# Patient Record
Sex: MALE | Race: BLACK OR AFRICAN AMERICAN | Employment: OTHER | URBAN - METROPOLITAN AREA
[De-identification: names, ages, dates, MRNs, and addresses within clinical notes are randomized per-mention and may not be internally consistent; named-entity substitution may affect disease eponyms.]

---

## 2021-08-04 ENCOUNTER — APPOINTMENT (OUTPATIENT)
Dept: GENERAL RADIOLOGY | Age: 71
DRG: 177 | End: 2021-08-04
Payer: MEDICARE

## 2021-08-04 ENCOUNTER — APPOINTMENT (OUTPATIENT)
Dept: CT IMAGING | Age: 71
DRG: 177 | End: 2021-08-04
Payer: MEDICARE

## 2021-08-04 ENCOUNTER — HOSPITAL ENCOUNTER (INPATIENT)
Age: 71
LOS: 5 days | Discharge: HOME | DRG: 177 | End: 2021-08-09
Attending: EMERGENCY MEDICINE | Admitting: INTERNAL MEDICINE
Payer: MEDICARE

## 2021-08-04 DIAGNOSIS — J69.0 ASPIRATION PNEUMONIA OF RIGHT LOWER LOBE, UNSPECIFIED ASPIRATION PNEUMONIA TYPE (HCC): Primary | ICD-10-CM

## 2021-08-04 DIAGNOSIS — E87.20 LACTIC ACIDOSIS: ICD-10-CM

## 2021-08-04 DIAGNOSIS — N17.9 AKI (ACUTE KIDNEY INJURY) (HCC): ICD-10-CM

## 2021-08-04 DIAGNOSIS — D72.829 LEUKOCYTOSIS, UNSPECIFIED TYPE: ICD-10-CM

## 2021-08-04 PROBLEM — K92.0 HEMATEMESIS: Status: ACTIVE | Noted: 2021-08-04

## 2021-08-04 PROBLEM — F10.10 ETOH ABUSE: Status: ACTIVE | Noted: 2021-08-04

## 2021-08-04 PROBLEM — J18.9 PNEUMONIA: Status: ACTIVE | Noted: 2021-08-04

## 2021-08-04 PROBLEM — J44.9 COPD (CHRONIC OBSTRUCTIVE PULMONARY DISEASE) (HCC): Status: ACTIVE | Noted: 2021-08-04

## 2021-08-04 LAB
ACANTHOCYTES: ABNORMAL
ACETAMINOPHEN LEVEL: <5 MCG/ML (ref 10–30)
ALBUMIN SERPL-MCNC: 4.6 G/DL (ref 3.5–5.2)
ALP BLD-CCNC: 100 U/L (ref 40–129)
ALT SERPL-CCNC: 18 U/L (ref 0–40)
AMPHETAMINE SCREEN, URINE: NOT DETECTED
ANION GAP SERPL CALCULATED.3IONS-SCNC: 26 MMOL/L (ref 7–16)
ANISOCYTOSIS: ABNORMAL
AST SERPL-CCNC: 28 U/L (ref 0–39)
B.E.: -4.9 MMOL/L (ref -3–3)
BACTERIA: ABNORMAL /HPF
BARBITURATE SCREEN URINE: NOT DETECTED
BASOPHILS ABSOLUTE: 0 E9/L (ref 0–0.2)
BASOPHILS RELATIVE PERCENT: 0.2 % (ref 0–2)
BENZODIAZEPINE SCREEN, URINE: NOT DETECTED
BILIRUB SERPL-MCNC: 0.5 MG/DL (ref 0–1.2)
BILIRUBIN URINE: NEGATIVE
BLOOD, URINE: ABNORMAL
BUN BLDV-MCNC: 26 MG/DL (ref 6–23)
BURR CELLS: ABNORMAL
CALCIUM SERPL-MCNC: 9.6 MG/DL (ref 8.6–10.2)
CANNABINOID SCREEN URINE: NOT DETECTED
CHLORIDE BLD-SCNC: 94 MMOL/L (ref 98–107)
CHP ED QC CHECK: NORMAL
CLARITY: CLEAR
CO2: 21 MMOL/L (ref 22–29)
COCAINE METABOLITE SCREEN URINE: NOT DETECTED
COHB: 1 % (ref 0–1.5)
COLOR: YELLOW
CREAT SERPL-MCNC: 1.4 MG/DL (ref 0.7–1.2)
CRITICAL: ABNORMAL
DATE ANALYZED: ABNORMAL
DATE OF COLLECTION: ABNORMAL
EKG ATRIAL RATE: 102 BPM
EKG P AXIS: 81 DEGREES
EKG P-R INTERVAL: 166 MS
EKG Q-T INTERVAL: 376 MS
EKG QRS DURATION: 84 MS
EKG QTC CALCULATION (BAZETT): 490 MS
EKG R AXIS: 73 DEGREES
EKG T AXIS: 49 DEGREES
EKG VENTRICULAR RATE: 102 BPM
EOSINOPHILS ABSOLUTE: 0 E9/L (ref 0.05–0.5)
EOSINOPHILS RELATIVE PERCENT: 31.8 % (ref 0–6)
ETHANOL: 29 MG/DL (ref 0–0.08)
FENTANYL SCREEN, URINE: NOT DETECTED
FERRITIN: 157 NG/ML
GFR AFRICAN AMERICAN: >60
GFR NON-AFRICAN AMERICAN: >60 ML/MIN/1.73
GLUCOSE BLD-MCNC: 112 MG/DL
GLUCOSE BLD-MCNC: 89 MG/DL (ref 74–99)
GLUCOSE URINE: NEGATIVE MG/DL
HCO3: 21.3 MMOL/L (ref 22–26)
HCT VFR BLD CALC: 38.8 % (ref 37–54)
HCT VFR BLD CALC: 43.4 % (ref 37–54)
HCT VFR BLD CALC: 49.3 % (ref 37–54)
HEMOGLOBIN: 11.3 G/DL (ref 12.5–16.5)
HEMOGLOBIN: 12.9 G/DL (ref 12.5–16.5)
HEMOGLOBIN: 14.4 G/DL (ref 12.5–16.5)
HHB: 8 % (ref 0–5)
HYALINE CASTS: ABNORMAL /LPF (ref 0–2)
KETONES, URINE: 15 MG/DL
LAB: ABNORMAL
LACTIC ACID, SEPSIS: 4.4 MMOL/L (ref 0.5–1.9)
LACTIC ACID, SEPSIS: 4.7 MMOL/L (ref 0.5–1.9)
LACTIC ACID: 10.3 MMOL/L (ref 0.5–2.2)
LACTIC ACID: 4.4 MMOL/L (ref 0.5–2.2)
LEUKOCYTE ESTERASE, URINE: NEGATIVE
LIPASE: 7 U/L (ref 13–60)
LYMPHOCYTES ABSOLUTE: 1 E9/L (ref 1.5–4)
LYMPHOCYTES RELATIVE PERCENT: 5.2 % (ref 20–42)
Lab: ABNORMAL
Lab: NORMAL
MAGNESIUM: 2.5 MG/DL (ref 1.6–2.6)
MCH RBC QN AUTO: 23.1 PG (ref 26–35)
MCHC RBC AUTO-ENTMCNC: 29.2 % (ref 32–34.5)
MCV RBC AUTO: 79.1 FL (ref 80–99.9)
METER GLUCOSE: 112 MG/DL (ref 74–99)
METHADONE SCREEN, URINE: NOT DETECTED
METHB: 0.4 % (ref 0–1.5)
MODE: ABNORMAL
MONOCYTES ABSOLUTE: 0.4 E9/L (ref 0.1–0.95)
MONOCYTES RELATIVE PERCENT: 1.7 % (ref 2–12)
NEUTROPHILS ABSOLUTE: 18.51 E9/L (ref 1.8–7.3)
NEUTROPHILS RELATIVE PERCENT: 93 % (ref 43–80)
NITRITE, URINE: NEGATIVE
O2 CONTENT: 17.7 ML/DL
O2 SATURATION: 91.9 % (ref 92–98.5)
O2HB: 90.6 % (ref 94–97)
OPERATOR ID: 925
OPIATE SCREEN URINE: NOT DETECTED
OVALOCYTES: ABNORMAL
OXYCODONE URINE: NOT DETECTED
PATIENT TEMP: 37 C
PCO2: 43.6 MMHG (ref 35–45)
PDW BLD-RTO: 14.4 FL (ref 11.5–15)
PH BLOOD GAS: 7.31 (ref 7.35–7.45)
PH UA: 5 (ref 5–9)
PHENCYCLIDINE SCREEN URINE: NOT DETECTED
PHOSPHORUS: 6.8 MG/DL (ref 2.5–4.5)
PLATELET # BLD: 263 E9/L (ref 130–450)
PMV BLD AUTO: 10.9 FL (ref 7–12)
PO2: 67.9 MMHG (ref 75–100)
POIKILOCYTES: ABNORMAL
POLYCHROMASIA: ABNORMAL
POTASSIUM SERPL-SCNC: 3.9 MMOL/L (ref 3.5–5)
PROCALCITONIN: 2.19 NG/ML (ref 0–0.08)
PROTEIN UA: NEGATIVE MG/DL
RBC # BLD: 6.23 E12/L (ref 3.8–5.8)
RBC UA: ABNORMAL /HPF (ref 0–2)
ROULEAUX: ABNORMAL
SALICYLATE, SERUM: <0.3 MG/DL (ref 0–30)
SCHISTOCYTES: ABNORMAL
SODIUM BLD-SCNC: 141 MMOL/L (ref 132–146)
SOURCE, BLOOD GAS: ABNORMAL
SPECIFIC GRAVITY UA: >=1.03 (ref 1–1.03)
THB: 13.9 G/DL (ref 11.5–16.5)
TIME ANALYZED: 845
TOTAL PROTEIN: 8.2 G/DL (ref 6.4–8.3)
TRICYCLIC ANTIDEPRESSANTS SCREEN SERUM: NEGATIVE NG/ML
TROPONIN, HIGH SENSITIVITY: 39 NG/L (ref 0–11)
TROPONIN, HIGH SENSITIVITY: 45 NG/L (ref 0–11)
TROPONIN, HIGH SENSITIVITY: 50 NG/L (ref 0–11)
UROBILINOGEN, URINE: 0.2 E.U./DL
WBC # BLD: 19.9 E9/L (ref 4.5–11.5)
WBC UA: ABNORMAL /HPF (ref 0–5)

## 2021-08-04 PROCEDURE — 84100 ASSAY OF PHOSPHORUS: CPT

## 2021-08-04 PROCEDURE — 94640 AIRWAY INHALATION TREATMENT: CPT

## 2021-08-04 PROCEDURE — 6360000002 HC RX W HCPCS: Performed by: INTERNAL MEDICINE

## 2021-08-04 PROCEDURE — 80143 DRUG ASSAY ACETAMINOPHEN: CPT

## 2021-08-04 PROCEDURE — 80053 COMPREHEN METABOLIC PANEL: CPT

## 2021-08-04 PROCEDURE — 94660 CPAP INITIATION&MGMT: CPT

## 2021-08-04 PROCEDURE — 6370000000 HC RX 637 (ALT 250 FOR IP): Performed by: EMERGENCY MEDICINE

## 2021-08-04 PROCEDURE — 80179 DRUG ASSAY SALICYLATE: CPT

## 2021-08-04 PROCEDURE — 6360000002 HC RX W HCPCS

## 2021-08-04 PROCEDURE — 93005 ELECTROCARDIOGRAM TRACING: CPT | Performed by: EMERGENCY MEDICINE

## 2021-08-04 PROCEDURE — 87040 BLOOD CULTURE FOR BACTERIA: CPT

## 2021-08-04 PROCEDURE — 36415 COLL VENOUS BLD VENIPUNCTURE: CPT

## 2021-08-04 PROCEDURE — 6370000000 HC RX 637 (ALT 250 FOR IP): Performed by: INTERNAL MEDICINE

## 2021-08-04 PROCEDURE — C9113 INJ PANTOPRAZOLE SODIUM, VIA: HCPCS | Performed by: STUDENT IN AN ORGANIZED HEALTH CARE EDUCATION/TRAINING PROGRAM

## 2021-08-04 PROCEDURE — 96367 TX/PROPH/DG ADDL SEQ IV INF: CPT

## 2021-08-04 PROCEDURE — 71045 X-RAY EXAM CHEST 1 VIEW: CPT

## 2021-08-04 PROCEDURE — 83690 ASSAY OF LIPASE: CPT

## 2021-08-04 PROCEDURE — 83605 ASSAY OF LACTIC ACID: CPT

## 2021-08-04 PROCEDURE — 96365 THER/PROPH/DIAG IV INF INIT: CPT

## 2021-08-04 PROCEDURE — 84145 PROCALCITONIN (PCT): CPT

## 2021-08-04 PROCEDURE — 74177 CT ABD & PELVIS W/CONTRAST: CPT

## 2021-08-04 PROCEDURE — 93010 ELECTROCARDIOGRAM REPORT: CPT | Performed by: INTERNAL MEDICINE

## 2021-08-04 PROCEDURE — 82728 ASSAY OF FERRITIN: CPT

## 2021-08-04 PROCEDURE — 6360000004 HC RX CONTRAST MEDICATION: Performed by: RADIOLOGY

## 2021-08-04 PROCEDURE — 2580000003 HC RX 258: Performed by: INTERNAL MEDICINE

## 2021-08-04 PROCEDURE — 2580000003 HC RX 258: Performed by: STUDENT IN AN ORGANIZED HEALTH CARE EDUCATION/TRAINING PROGRAM

## 2021-08-04 PROCEDURE — 81001 URINALYSIS AUTO W/SCOPE: CPT

## 2021-08-04 PROCEDURE — 99285 EMERGENCY DEPT VISIT HI MDM: CPT

## 2021-08-04 PROCEDURE — 85014 HEMATOCRIT: CPT

## 2021-08-04 PROCEDURE — 83735 ASSAY OF MAGNESIUM: CPT

## 2021-08-04 PROCEDURE — 94664 DEMO&/EVAL PT USE INHALER: CPT

## 2021-08-04 PROCEDURE — 2700000000 HC OXYGEN THERAPY PER DAY

## 2021-08-04 PROCEDURE — 2060000000 HC ICU INTERMEDIATE R&B

## 2021-08-04 PROCEDURE — 2580000003 HC RX 258: Performed by: EMERGENCY MEDICINE

## 2021-08-04 PROCEDURE — C9113 INJ PANTOPRAZOLE SODIUM, VIA: HCPCS | Performed by: INTERNAL MEDICINE

## 2021-08-04 PROCEDURE — 82962 GLUCOSE BLOOD TEST: CPT

## 2021-08-04 PROCEDURE — 6360000002 HC RX W HCPCS: Performed by: STUDENT IN AN ORGANIZED HEALTH CARE EDUCATION/TRAINING PROGRAM

## 2021-08-04 PROCEDURE — 82077 ASSAY SPEC XCP UR&BREATH IA: CPT

## 2021-08-04 PROCEDURE — 96361 HYDRATE IV INFUSION ADD-ON: CPT

## 2021-08-04 PROCEDURE — 2500000003 HC RX 250 WO HCPCS: Performed by: STUDENT IN AN ORGANIZED HEALTH CARE EDUCATION/TRAINING PROGRAM

## 2021-08-04 PROCEDURE — 80307 DRUG TEST PRSMV CHEM ANLYZR: CPT

## 2021-08-04 PROCEDURE — 82805 BLOOD GASES W/O2 SATURATION: CPT

## 2021-08-04 PROCEDURE — 85018 HEMOGLOBIN: CPT

## 2021-08-04 PROCEDURE — 84484 ASSAY OF TROPONIN QUANT: CPT

## 2021-08-04 PROCEDURE — 85025 COMPLETE CBC W/AUTO DIFF WBC: CPT

## 2021-08-04 RX ORDER — ACETAMINOPHEN 325 MG/1
650 TABLET ORAL EVERY 6 HOURS PRN
Status: DISCONTINUED | OUTPATIENT
Start: 2021-08-04 | End: 2021-08-09 | Stop reason: HOSPADM

## 2021-08-04 RX ORDER — PANTOPRAZOLE SODIUM 40 MG/10ML
40 INJECTION, POWDER, LYOPHILIZED, FOR SOLUTION INTRAVENOUS EVERY 12 HOURS
Status: DISCONTINUED | OUTPATIENT
Start: 2021-08-04 | End: 2021-08-09 | Stop reason: HOSPADM

## 2021-08-04 RX ORDER — FENTANYL CITRATE 50 UG/ML
50 INJECTION, SOLUTION INTRAMUSCULAR; INTRAVENOUS ONCE
Status: COMPLETED | OUTPATIENT
Start: 2021-08-04 | End: 2021-08-04

## 2021-08-04 RX ORDER — LORAZEPAM 2 MG/ML
4 INJECTION INTRAMUSCULAR
Status: DISCONTINUED | OUTPATIENT
Start: 2021-08-04 | End: 2021-08-09 | Stop reason: HOSPADM

## 2021-08-04 RX ORDER — SODIUM CHLORIDE 9 MG/ML
INJECTION, SOLUTION INTRAVENOUS CONTINUOUS
Status: DISCONTINUED | OUTPATIENT
Start: 2021-08-04 | End: 2021-08-04

## 2021-08-04 RX ORDER — LORAZEPAM 1 MG/1
2 TABLET ORAL
Status: DISCONTINUED | OUTPATIENT
Start: 2021-08-04 | End: 2021-08-09 | Stop reason: HOSPADM

## 2021-08-04 RX ORDER — SODIUM CHLORIDE 9 MG/ML
25 INJECTION, SOLUTION INTRAVENOUS PRN
Status: DISCONTINUED | OUTPATIENT
Start: 2021-08-04 | End: 2021-08-09 | Stop reason: HOSPADM

## 2021-08-04 RX ORDER — THIAMINE HYDROCHLORIDE 100 MG/ML
100 INJECTION, SOLUTION INTRAMUSCULAR; INTRAVENOUS DAILY
Status: DISCONTINUED | OUTPATIENT
Start: 2021-08-04 | End: 2021-08-04 | Stop reason: SDUPTHER

## 2021-08-04 RX ORDER — BUDESONIDE AND FORMOTEROL FUMARATE DIHYDRATE 160; 4.5 UG/1; UG/1
2 AEROSOL RESPIRATORY (INHALATION) 2 TIMES DAILY
Status: DISCONTINUED | OUTPATIENT
Start: 2021-08-04 | End: 2021-08-05 | Stop reason: CLARIF

## 2021-08-04 RX ORDER — ATORVASTATIN CALCIUM 20 MG/1
20 TABLET, FILM COATED ORAL DAILY
Status: DISCONTINUED | OUTPATIENT
Start: 2021-08-04 | End: 2021-08-09 | Stop reason: HOSPADM

## 2021-08-04 RX ORDER — FENTANYL CITRATE 50 UG/ML
INJECTION, SOLUTION INTRAMUSCULAR; INTRAVENOUS
Status: COMPLETED
Start: 2021-08-04 | End: 2021-08-04

## 2021-08-04 RX ORDER — ONDANSETRON 2 MG/ML
4 INJECTION INTRAMUSCULAR; INTRAVENOUS EVERY 6 HOURS PRN
Status: DISCONTINUED | OUTPATIENT
Start: 2021-08-04 | End: 2021-08-09 | Stop reason: HOSPADM

## 2021-08-04 RX ORDER — SODIUM CHLORIDE 9 MG/ML
INJECTION, SOLUTION INTRAVENOUS CONTINUOUS
Status: DISCONTINUED | OUTPATIENT
Start: 2021-08-04 | End: 2021-08-05

## 2021-08-04 RX ORDER — LORAZEPAM 1 MG/1
1 TABLET ORAL
Status: DISCONTINUED | OUTPATIENT
Start: 2021-08-04 | End: 2021-08-09 | Stop reason: HOSPADM

## 2021-08-04 RX ORDER — IPRATROPIUM BROMIDE AND ALBUTEROL SULFATE 2.5; .5 MG/3ML; MG/3ML
1 SOLUTION RESPIRATORY (INHALATION)
Status: COMPLETED | OUTPATIENT
Start: 2021-08-04 | End: 2021-08-04

## 2021-08-04 RX ORDER — LORAZEPAM 2 MG/ML
INJECTION INTRAMUSCULAR
Status: COMPLETED
Start: 2021-08-04 | End: 2021-08-04

## 2021-08-04 RX ORDER — LORAZEPAM 2 MG/ML
3 INJECTION INTRAMUSCULAR
Status: DISCONTINUED | OUTPATIENT
Start: 2021-08-04 | End: 2021-08-09 | Stop reason: HOSPADM

## 2021-08-04 RX ORDER — 0.9 % SODIUM CHLORIDE 0.9 %
1000 INTRAVENOUS SOLUTION INTRAVENOUS ONCE
Status: COMPLETED | OUTPATIENT
Start: 2021-08-04 | End: 2021-08-04

## 2021-08-04 RX ORDER — THIAMINE HYDROCHLORIDE 100 MG/ML
100 INJECTION, SOLUTION INTRAMUSCULAR; INTRAVENOUS DAILY
Status: DISCONTINUED | OUTPATIENT
Start: 2021-08-04 | End: 2021-08-09 | Stop reason: HOSPADM

## 2021-08-04 RX ORDER — LORAZEPAM 2 MG/ML
2 INJECTION INTRAMUSCULAR
Status: DISCONTINUED | OUTPATIENT
Start: 2021-08-04 | End: 2021-08-09 | Stop reason: HOSPADM

## 2021-08-04 RX ORDER — ACETAMINOPHEN 650 MG/1
650 SUPPOSITORY RECTAL EVERY 6 HOURS PRN
Status: DISCONTINUED | OUTPATIENT
Start: 2021-08-04 | End: 2021-08-09 | Stop reason: HOSPADM

## 2021-08-04 RX ORDER — SODIUM CHLORIDE 9 MG/ML
10 INJECTION INTRAVENOUS EVERY 12 HOURS
Status: DISCONTINUED | OUTPATIENT
Start: 2021-08-04 | End: 2021-08-09 | Stop reason: HOSPADM

## 2021-08-04 RX ORDER — FOLIC ACID 5 MG/ML
1 INJECTION, SOLUTION INTRAMUSCULAR; INTRAVENOUS; SUBCUTANEOUS DAILY
Status: DISCONTINUED | OUTPATIENT
Start: 2021-08-04 | End: 2021-08-09 | Stop reason: HOSPADM

## 2021-08-04 RX ORDER — LORAZEPAM 2 MG/ML
1 INJECTION INTRAMUSCULAR
Status: DISCONTINUED | OUTPATIENT
Start: 2021-08-04 | End: 2021-08-09 | Stop reason: HOSPADM

## 2021-08-04 RX ORDER — LORAZEPAM 1 MG/1
4 TABLET ORAL
Status: DISCONTINUED | OUTPATIENT
Start: 2021-08-04 | End: 2021-08-09 | Stop reason: HOSPADM

## 2021-08-04 RX ORDER — SODIUM CHLORIDE 0.9 % (FLUSH) 0.9 %
5-40 SYRINGE (ML) INJECTION PRN
Status: DISCONTINUED | OUTPATIENT
Start: 2021-08-04 | End: 2021-08-09 | Stop reason: HOSPADM

## 2021-08-04 RX ORDER — ONDANSETRON 4 MG/1
4 TABLET, ORALLY DISINTEGRATING ORAL EVERY 8 HOURS PRN
Status: DISCONTINUED | OUTPATIENT
Start: 2021-08-04 | End: 2021-08-09 | Stop reason: HOSPADM

## 2021-08-04 RX ORDER — MULTIVITAMIN WITH IRON
1 TABLET ORAL DAILY
Status: DISCONTINUED | OUTPATIENT
Start: 2021-08-04 | End: 2021-08-09 | Stop reason: HOSPADM

## 2021-08-04 RX ORDER — SODIUM CHLORIDE 0.9 % (FLUSH) 0.9 %
5-40 SYRINGE (ML) INJECTION EVERY 12 HOURS SCHEDULED
Status: DISCONTINUED | OUTPATIENT
Start: 2021-08-04 | End: 2021-08-09 | Stop reason: HOSPADM

## 2021-08-04 RX ORDER — LORAZEPAM 1 MG/1
3 TABLET ORAL
Status: DISCONTINUED | OUTPATIENT
Start: 2021-08-04 | End: 2021-08-09 | Stop reason: HOSPADM

## 2021-08-04 RX ORDER — AMLODIPINE BESYLATE 5 MG/1
5 TABLET ORAL DAILY
Status: DISCONTINUED | OUTPATIENT
Start: 2021-08-04 | End: 2021-08-08

## 2021-08-04 RX ADMIN — NITROGLYCERIN 0.5 INCH: 20 OINTMENT TOPICAL at 09:02

## 2021-08-04 RX ADMIN — ACETAMINOPHEN 650 MG: 650 SUPPOSITORY RECTAL at 09:46

## 2021-08-04 RX ADMIN — PANTOPRAZOLE SODIUM 40 MG: 40 INJECTION, POWDER, FOR SOLUTION INTRAVENOUS at 07:46

## 2021-08-04 RX ADMIN — FENTANYL CITRATE 50 MCG: 50 INJECTION, SOLUTION INTRAMUSCULAR; INTRAVENOUS at 09:27

## 2021-08-04 RX ADMIN — FOLIC ACID 1 MG: 5 INJECTION, SOLUTION INTRAMUSCULAR; INTRAVENOUS; SUBCUTANEOUS at 09:50

## 2021-08-04 RX ADMIN — SODIUM CHLORIDE 1000 ML: 9 INJECTION, SOLUTION INTRAVENOUS at 04:40

## 2021-08-04 RX ADMIN — IPRATROPIUM BROMIDE AND ALBUTEROL SULFATE 1 AMPULE: .5; 3 SOLUTION RESPIRATORY (INHALATION) at 06:14

## 2021-08-04 RX ADMIN — SODIUM CHLORIDE: 9 INJECTION, SOLUTION INTRAVENOUS at 07:37

## 2021-08-04 RX ADMIN — SODIUM CHLORIDE: 9 INJECTION, SOLUTION INTRAVENOUS at 22:26

## 2021-08-04 RX ADMIN — IPRATROPIUM BROMIDE AND ALBUTEROL SULFATE 1 AMPULE: .5; 3 SOLUTION RESPIRATORY (INHALATION) at 06:21

## 2021-08-04 RX ADMIN — AMPICILLIN SODIUM AND SULBACTAM SODIUM 3000 MG: 2; 1 INJECTION, POWDER, FOR SOLUTION INTRAMUSCULAR; INTRAVENOUS at 06:09

## 2021-08-04 RX ADMIN — THIAMINE HYDROCHLORIDE 100 MG: 100 INJECTION, SOLUTION INTRAMUSCULAR; INTRAVENOUS at 09:53

## 2021-08-04 RX ADMIN — Medication 10 ML: at 09:28

## 2021-08-04 RX ADMIN — LORAZEPAM 2 MG: 2 INJECTION INTRAMUSCULAR; INTRAVENOUS at 07:40

## 2021-08-04 RX ADMIN — AMPICILLIN SODIUM AND SULBACTAM SODIUM 3000 MG: 2; 1 INJECTION, POWDER, FOR SOLUTION INTRAMUSCULAR; INTRAVENOUS at 09:00

## 2021-08-04 RX ADMIN — IOPAMIDOL 90 ML: 755 INJECTION, SOLUTION INTRAVENOUS at 05:52

## 2021-08-04 RX ADMIN — Medication 10 ML: at 22:31

## 2021-08-04 RX ADMIN — PANTOPRAZOLE SODIUM 40 MG: 40 INJECTION, POWDER, FOR SOLUTION INTRAVENOUS at 22:30

## 2021-08-04 RX ADMIN — AMPICILLIN SODIUM AND SULBACTAM SODIUM 3000 MG: 2; 1 INJECTION, POWDER, FOR SOLUTION INTRAMUSCULAR; INTRAVENOUS at 13:11

## 2021-08-04 RX ADMIN — ONDANSETRON 4 MG: 2 INJECTION INTRAMUSCULAR; INTRAVENOUS at 07:45

## 2021-08-04 RX ADMIN — ACETAMINOPHEN 650 MG: 325 TABLET ORAL at 23:55

## 2021-08-04 RX ADMIN — AMPICILLIN SODIUM AND SULBACTAM SODIUM 3000 MG: 2; 1 INJECTION, POWDER, FOR SOLUTION INTRAMUSCULAR; INTRAVENOUS at 23:55

## 2021-08-04 RX ADMIN — SODIUM CHLORIDE, PRESERVATIVE FREE 10 ML: 5 INJECTION INTRAVENOUS at 07:42

## 2021-08-04 RX ADMIN — IPRATROPIUM BROMIDE AND ALBUTEROL SULFATE 1 AMPULE: .5; 3 SOLUTION RESPIRATORY (INHALATION) at 06:15

## 2021-08-04 RX ADMIN — SODIUM CHLORIDE 160 MG/HR: 9 INJECTION, SOLUTION INTRAVENOUS at 04:42

## 2021-08-04 ASSESSMENT — ENCOUNTER SYMPTOMS
VOMITING: 1
ABDOMINAL PAIN: 1
ABDOMINAL DISTENTION: 1

## 2021-08-04 ASSESSMENT — PAIN SCALES - GENERAL
PAINLEVEL_OUTOF10: 0
PAINLEVEL_OUTOF10: 7
PAINLEVEL_OUTOF10: 8
PAINLEVEL_OUTOF10: 5
PAINLEVEL_OUTOF10: 5

## 2021-08-04 ASSESSMENT — PAIN DESCRIPTION - LOCATION
LOCATION: ABDOMEN
LOCATION: THROAT

## 2021-08-04 ASSESSMENT — PAIN DESCRIPTION - DESCRIPTORS
DESCRIPTORS: BURNING
DESCRIPTORS: BURNING

## 2021-08-04 ASSESSMENT — PAIN DESCRIPTION - FREQUENCY: FREQUENCY: CONTINUOUS

## 2021-08-04 ASSESSMENT — PAIN DESCRIPTION - ORIENTATION: ORIENTATION: LOWER

## 2021-08-04 ASSESSMENT — PAIN DESCRIPTION - PAIN TYPE: TYPE: ACUTE PAIN

## 2021-08-04 NOTE — CONSULTS
GENERAL SURGERY  CONSULT NOTE  8/4/2021    Physician Consulted: Dr. Chepe Abernathy  Reason for Consult: Hematemesis  Referring Physician: Dr. Sterling Olivera    HPI  Jennifer Todd is a 65 y.o. male with no significant PMH who presented to the ED after an episode of hematemesis. Patient was drinking last night and had an episode of forceful vomiting, where he noticed thick, bright red blood. He hasn't had any further episodes and denies nausea. He denies any bloody bowel movements or BRBPR. He is also complaining of some abdominal pain, non-specific. Currently on PAP, apparently for some desaturations while patient was lying on his back. CXR showing bibasilar atelectasis. He has never had an EGD or colonoscopy before. CT A/P showing wall thickening of esophagus. Past Medical History:   Diagnosis Date    Arthritis     Hypertension     Tobacco abuse        History reviewed. No pertinent surgical history. Medications Prior to Admission:    Prior to Admission medications    Not on File       No Known Allergies    History reviewed. No pertinent family history. Social History     Tobacco Use    Smoking status: Current Every Day Smoker     Packs/day: 0.25     Years: 50.00     Pack years: 12.50     Types: Cigarettes    Smokeless tobacco: Never Used   Vaping Use    Vaping Use: Never used   Substance Use Topics    Alcohol use: Yes     Comment: social    Drug use: No         Review of Systems   General ROS: negative  Hematological and Lymphatic ROS: negative  Respiratory ROS: negative  Cardiovascular ROS: negative  Gastrointestinal ROS: positive for - abdominal pain and hematemesis  Genito-Urinary ROS: negative  Musculoskeletal ROS: negative      PHYSICAL EXAM:    Vitals:    08/04/21 1116   BP: (!) 135/92   Pulse: 105   Resp: 13   Temp:    SpO2: 97%       General Appearance:  awake, alert, oriented, in no acute distress  Skin:  Skin color, texture, turgor normal. No rashes or lesions.   Head/face:  NCAT  Eyes:  No gross abnormalities. Lungs: normal respiratory effort on BiPAP  Heart:  Heart regular rate and rhythm   Abdomen:  Soft, moderately distended, minimally tender to palpation suprapubic, no rebound or guarding  Extremities: Extremities warm to touch, pink, with no edema. Male Rectal:  Rectal exam deferred. LABS:    CBC  Recent Labs     08/04/21 0352   WBC 19.9*   HGB 14.4   HCT 49.3        BMP  Recent Labs     08/04/21 0352      K 3.9   CL 94*   CO2 21*   BUN 26*   CREATININE 1.4*   CALCIUM 9.6     Liver Function  Recent Labs     08/04/21 0352   LIPASE 7*   BILITOT 0.5   AST 28   ALT 18   ALKPHOS 100   PROT 8.2   LABALBU 4.6     No results for input(s): LACTATE in the last 72 hours. No results for input(s): INR, PTT in the last 72 hours. Invalid input(s): PT    RADIOLOGY    CT ABDOMEN PELVIS W IV CONTRAST Additional Contrast? None    Result Date: 8/4/2021  EXAMINATION: CT OF THE ABDOMEN AND PELVIS WITH CONTRAST 8/4/2021 5:40 am TECHNIQUE: CT of the abdomen and pelvis was performed with the administration of intravenous contrast. Multiplanar reformatted images are provided for review. Dose modulation, iterative reconstruction, and/or weight based adjustment of the mA/kV was utilized to reduce the radiation dose to as low as reasonably achievable. COMPARISON: None. HISTORY: ORDERING SYSTEM PROVIDED HISTORY: alcoholic, periumbilical pain TECHNOLOGIST PROVIDED HISTORY: Additional Contrast?->None Reason for exam:->alcoholic, periumbilical pain What reading provider will be dictating this exam?->CRC FINDINGS: Lower Chest: There is wall thickening of visualized distal esophagus. This could represent esophagitis. Distal esophageal mass less likely but not excluded. Consider endoscopy. There are coronary artery calcifications. Volume loss and hazy increased density within the right middle lobe is likely atelectasis. There is mild hilar lymphadenopathy seen bilaterally. Organs:  There are multiple very small low-density liver lesions which are probably small cysts. These are too small to definitively characterize, however. The spleen, adrenal glands demonstrate no significant abnormality. There are multiple right renal cysts. There is a punctate non-obstructing left intrarenal calculus. GI/Bowel: There are no findings of intestinal obstruction. The appendix is normal.  There is diverticulosis involving left colon. There is no acute diverticulitis seen. There is cholelithiasis. Pelvis:  Bladder is unremarkable in appearance. There is no abnormal pelvic mass or fluid collection seen. Peritoneum/Retroperitoneum: There are aortoiliac atherosclerotic calcification. There is no abdominal aortic aneurysm. There is no abnormal lymphadenopathy seen. There is no free intraperitoneal air. There is no abnormal fluid collection. Bones/Soft Tissues: No acute abnormality seen. 1. There is wall thickening of visualized distal esophagus which may represent esophagitis, less likely esophageal mass. Suggest correlation with endoscopy. 2. Cholelithiasis. 3. Punctate non-obstructing left intrarenal calculus. 4. Tiny low-density liver lesions are likely small cysts although too small to definitively characterize. 5. Diverticulosis. No acute diverticulitis seen. 6. Normal appendix. 7. There is mild lymphadenopathy involving visualized roberto. XR CHEST PORTABLE    Result Date: 8/4/2021  EXAMINATION: ONE XRAY VIEW OF THE CHEST 8/4/2021 9:14 am COMPARISON: August 4, 2021 chest radiograph HISTORY: ORDERING SYSTEM PROVIDED HISTORY: worsening hypoxia TECHNOLOGIST PROVIDED HISTORY: Reason for exam:->worsening hypoxia What reading provider will be dictating this exam?->CRC FINDINGS: Trachea is midline. Cardiomediastinal silhouette stable in size. Interval development of linear atelectasis of the right lower lobe and left midlung. No pneumothorax. No focal consolidations. No pleural effusions.      Atelectasis in the bilateral mid to lower lungs. XR CHEST PORTABLE    Result Date: 8/4/2021  EXAMINATION: ONE XRAY VIEW OF THE CHEST 8/4/2021 3:53 am COMPARISON: 07/02/2017 HISTORY: ORDERING SYSTEM PROVIDED HISTORY: vomiting TECHNOLOGIST PROVIDED HISTORY: Reason for exam:->vomiting What reading provider will be dictating this exam?->CRC FINDINGS: There is some right infrahilar infiltrate. Mild interstitial prominence may be chronic. There is no pneumothorax. There is no pleural effusion. There is no cardiomegaly or vascular congestion. Suspect right infrahilar infiltrate. ASSESSMENT:  65 y.o. male with episode of hematemesis following an episode of binge drinking, likely due Peggy-Ward tear. Patient will need an EGD, but only once respiratory status improves.      PLAN:  - recommend admission to medicine given respiratory status  - NPO + mIVF  - will plan for EGD once respiratory status improves  - Continue to monitor H/H  - PPI/Carafate  - Discussed with Dr. Jamey Anton    Electronically signed by Gordon Nicole MD on 8/4/21 at 12:16 PM EDT

## 2021-08-04 NOTE — ED NOTES
Repeat Lactic Acid sent after 2 liters of fluids per  verbal orders.       Tammy Wall RN  08/04/21 4571

## 2021-08-04 NOTE — ED NOTES
Complaints of abd pain with burning sensation. Abd distended  and firm.  at bedside. Pt audible wheezes, Respiratory with aerosol treatment .  Family present      Arturo Ely RN  08/04/21 8423

## 2021-08-04 NOTE — H&P
History and Physical      CHIEF COMPLAINT: Emesis    Reason for Admission: Likely aspiration, hypoglycemia, alcohol intoxication    History Obtained From: Patient    PCP :  No primary care provider on file. No primary physician on file. HISTORY OF PRESENT ILLNESS:      The patient is a 65 y.o. male presented to the emergency room via EMS after an episode of emesis. Patient apparently has been drinking gin heavily. There was a questionable red blood in his vomitus. In the emergency room patient was noted to be hypoxic. Aspiration pneumonia was suspected. He was also noted to be hypoglycemic. Patient was complaining of abdominal pain. Patient was then admitted for further evaluation and treatment. At the time of my questioning patient is on BiPAP, daughter by the bedside. He denies abdominal pain at the time of my examination. Past Medical History:        Diagnosis Date    Arthritis     Hypertension     Tobacco abuse      Past Surgical History:    History reviewed. No pertinent surgical history. Medications Prior to Admission:    Not in a hospital admission. Allergies:  Patient has no known allergies.     Social History:   Social History     Socioeconomic History    Marital status:      Spouse name: Not on file    Number of children: Not on file    Years of education: Not on file    Highest education level: Not on file   Occupational History    Not on file   Tobacco Use    Smoking status: Current Every Day Smoker     Packs/day: 0.25     Years: 50.00     Pack years: 12.50     Types: Cigarettes    Smokeless tobacco: Never Used   Vaping Use    Vaping Use: Never used   Substance and Sexual Activity    Alcohol use: Yes     Comment: social    Drug use: No    Sexual activity: Not on file   Other Topics Concern    Not on file   Social History Narrative    Not on file     Social Determinants of Health     Financial Resource Strain:     Difficulty of Paying Living Expenses:    Food Insecurity:     Worried About Running Out of Food in the Last Year:     [address] in the Last Year:    Transportation Needs:     Lack of Transportation (Medical):  Lack of Transportation (Non-Medical):    Physical Activity:     Days of Exercise per Week:     Minutes of Exercise per Session:    Stress:     Feeling of Stress :    Social Connections:     Frequency of Communication with Friends and Family:     Frequency of Social Gatherings with Friends and Family:     Attends [employer] Services:     Active Member of Clubs or Organizations:     Attends Club or Organization Meetings:     Marital Status:    Intimate Partner Violence:     Fear of Current or Ex-Partner:     Emotionally Abused:     Physically Abused:     Sexually Abused:          Family History:   History reviewed. No pertinent family history. REVIEW OF SYSTEMS:    General ROS: negative  Hematological and Lymphatic ROS: negative  Endocrine ROS: negative  Respiratory ROS: no cough,  wheezing  or shortness of breath,   Cardiovascular ROS: no chest pain or dyspnea on exertion  Gastrointestinal ROS: Positive for emesis, dark blood in the emesis, abdominal bloating Genito-Urinary ROS: no dysuria, trouble voiding, or hematuria  Neurological ROS: no TIA or stroke symptoms  negative    Vitals:  BP (!) 147/99   Pulse 98   Temp 100.1 °F (37.8 °C) (Oral)   Resp 23   Ht 5' 6\" (1.676 m)   Wt 140 lb (63.5 kg)   SpO2 99%   BMI 22.60 kg/m²     PHYSICAL EXAM:  General:  Awake, alert, oriented X 3. Well developed, well nourished, well groomed. No apparent distress. HEENT:  Normocephalic, atraumatic. Pupils equal, round, reactive to light. No scleral icterus. No conjunctival injection. Neck:  Supple, no carotid bruits  Heart:  RRR,   Lungs:  CTA bilaterally, bilat symmetrical expansion, no wheeze, rales, or rhonchi  Abdomen:   Bowel sounds present, soft, nontender, no masses, no organomegaly, no peritoneal signs  Extremities:  No clubbing, cyanosis, or edema  Skin:  Warm and dry, no open lesions or rash  Neuro:  Cranial nerves 2-12 intact, no focal deficits      DATA:     Recent Results (from the past 24 hour(s))   CBC auto differential    Collection Time: 08/04/21  3:52 AM   Result Value Ref Range    WBC 19.9 (H) 4.5 - 11.5 E9/L    RBC 6.23 (H) 3.80 - 5.80 E12/L    Hemoglobin 14.4 12.5 - 16.5 g/dL    Hematocrit 49.3 37.0 - 54.0 %    MCV 79.1 (L) 80.0 - 99.9 fL    MCH 23.1 (L) 26.0 - 35.0 pg    MCHC 29.2 (L) 32.0 - 34.5 %    RDW 14.4 11.5 - 15.0 fL    Platelets 921 900 - 993 E9/L    MPV 10.9 7.0 - 12.0 fL    Neutrophils % 93.0 (H) 43.0 - 80.0 %    Lymphocytes % 5.2 (L) 20.0 - 42.0 %    Monocytes % 1.7 (L) 2.0 - 12.0 %    Eosinophils % 31.8 (H) 0.0 - 6.0 %    Basophils % 0.2 0.0 - 2.0 %    Neutrophils Absolute 18.51 (H) 1.80 - 7.30 E9/L    Lymphocytes Absolute 1.00 (L) 1.50 - 4.00 E9/L    Monocytes Absolute 0.40 0.10 - 0.95 E9/L    Eosinophils Absolute 0.00 (L) 0.05 - 0.50 E9/L    Basophils Absolute 0.00 0.00 - 0.20 E9/L    Anisocytosis 1+     Polychromasia 1+     Poikilocytes 3+     Schistocytes 1+     Acanthocytes 1+     Montchanin Cells 3+     Ovalocytes 2+     Rouleaux 1+    Comprehensive Metabolic Panel    Collection Time: 08/04/21  3:52 AM   Result Value Ref Range    Sodium 141 132 - 146 mmol/L    Potassium 3.9 3.5 - 5.0 mmol/L    Chloride 94 (L) 98 - 107 mmol/L    CO2 21 (L) 22 - 29 mmol/L    Anion Gap 26 (H) 7 - 16 mmol/L    Glucose 89 74 - 99 mg/dL    BUN 26 (H) 6 - 23 mg/dL    CREATININE 1.4 (H) 0.7 - 1.2 mg/dL    GFR Non-African American >60 >=60 mL/min/1.73    GFR African American >60     Calcium 9.6 8.6 - 10.2 mg/dL    Total Protein 8.2 6.4 - 8.3 g/dL    Albumin 4.6 3.5 - 5.2 g/dL    Total Bilirubin 0.5 0.0 - 1.2 mg/dL    Alkaline Phosphatase 100 40 - 129 U/L    ALT 18 0 - 40 U/L    AST 28 0 - 39 U/L   Troponin    Collection Time: 08/04/21  3:52 AM   Result Value Ref Range    Troponin, High Sensitivity 39 (H) 0 - 11 ng/L   Serum Drug Screen    Collection Time: 08/04/21  3:52 AM   Result Value Ref Range    Ethanol Lvl 29 mg/dL    Acetaminophen Level <5.0 (L) 10.0 - 19.8 mcg/mL    Salicylate, Serum <3.8 0.0 - 30.0 mg/dL    TCA Scrn NEGATIVE Cutoff:300 ng/mL   Urine Drug Screen    Collection Time: 08/04/21  3:52 AM   Result Value Ref Range    Amphetamine Screen, Urine NOT DETECTED Negative <1000 ng/mL    Barbiturate Screen, Ur NOT DETECTED Negative < 200 ng/mL    Benzodiazepine Screen, Urine NOT DETECTED Negative < 200 ng/mL    Cannabinoid Scrn, Ur NOT DETECTED Negative < 50ng/mL    Cocaine Metabolite Screen, Urine NOT DETECTED Negative < 300 ng/mL    Opiate Scrn, Ur NOT DETECTED Negative < 300ng/mL    PCP Screen, Urine NOT DETECTED Negative < 25 ng/mL    Methadone Screen, Urine NOT DETECTED Negative <300 ng/mL    Oxycodone Urine NOT DETECTED Negative <100 ng/mL    FENTANYL SCREEN, URINE NOT DETECTED Negative <1 ng/mL    Drug Screen Comment: see below    Lipase    Collection Time: 08/04/21  3:52 AM   Result Value Ref Range    Lipase 7 (L) 13 - 60 U/L   Lactic Acid, Plasma    Collection Time: 08/04/21  3:52 AM   Result Value Ref Range    Lactic Acid 10.3 (HH) 0.5 - 2.2 mmol/L   Urinalysis    Collection Time: 08/04/21  3:52 AM   Result Value Ref Range    Color, UA Yellow Straw/Yellow    Clarity, UA Clear Clear    Glucose, Ur Negative Negative mg/dL    Bilirubin Urine Negative Negative    Ketones, Urine 15 (A) Negative mg/dL    Specific Gravity, UA >=1.030 1.005 - 1.030    Blood, Urine TRACE-INTACT Negative    pH, UA 5.0 5.0 - 9.0    Protein, UA Negative Negative mg/dL    Urobilinogen, Urine 0.2 <2.0 E.U./dL    Nitrite, Urine Negative Negative    Leukocyte Esterase, Urine Negative Negative   Magnesium    Collection Time: 08/04/21  3:52 AM   Result Value Ref Range    Magnesium 2.5 1.6 - 2.6 mg/dL   PHOSPHORUS    Collection Time: 08/04/21  3:52 AM   Result Value Ref Range    Phosphorus 6.8 (H) 2.5 - 4.5 mg/dL   Microscopic Urinalysis    Collection Time: 08/04/21  3:52 AM   Result Value Ref Range    Hyaline Casts, UA 0-2 0 - 2 /LPF    WBC, UA 0-1 0 - 5 /HPF    RBC, UA 1-3 0 - 2 /HPF    Bacteria, UA FEW (A) None Seen /HPF   Procalcitonin    Collection Time: 08/04/21  3:52 AM   Result Value Ref Range    Procalcitonin 2.19 (H) 0.00 - 0.08 ng/mL   Ferritin    Collection Time: 08/04/21  3:52 AM   Result Value Ref Range    Ferritin 157 ng/mL   EKG 12 Lead    Collection Time: 08/04/21  4:07 AM   Result Value Ref Range    Ventricular Rate 102 BPM    Atrial Rate 102 BPM    P-R Interval 166 ms    QRS Duration 84 ms    Q-T Interval 376 ms    QTc Calculation (Bazett) 490 ms    P Axis 81 degrees    R Axis 73 degrees    T Axis 49 degrees   Troponin    Collection Time: 08/04/21  5:57 AM   Result Value Ref Range    Troponin, High Sensitivity 50 (H) 0 - 11 ng/L   Lactic Acid, Plasma    Collection Time: 08/04/21  6:45 AM   Result Value Ref Range    Lactic Acid 4.4 (HH) 0.5 - 2.2 mmol/L   Lactate, Sepsis    Collection Time: 08/04/21  6:45 AM   Result Value Ref Range    Lactic Acid, Sepsis 4.4 (HH) 0.5 - 1.9 mmol/L   Lactate, Sepsis    Collection Time: 08/04/21  8:36 AM   Result Value Ref Range    Lactic Acid, Sepsis 4.7 (HH) 0.5 - 1.9 mmol/L   Troponin    Collection Time: 08/04/21  8:36 AM   Result Value Ref Range    Troponin, High Sensitivity 45 (H) 0 - 11 ng/L   Blood Gas, Arterial    Collection Time: 08/04/21  8:45 AM   Result Value Ref Range    Date Analyzed [id number]     Time Analyzed 0845     Source: Blood Arterial     pH, Blood Gas 7.307 (L) 7.350 - 7.450    PCO2 43.6 35.0 - 45.0 mmHg    PO2 67.9 (L) 75.0 - 100.0 mmHg    HCO3 21.3 (L) 22.0 - 26.0 mmol/L    B.E. -4.9 (L) -3.0 - 3.0 mmol/L    O2 Sat 91.9 (L) 92.0 - 98.5 %    O2Hb 90.6 (L) 94.0 - 97.0 %    COHb 1.0 0.0 - 1.5 %    MetHb 0.4 0.0 - 1.5 %    O2 Content 17.7 mL/dL    HHb 8.0 (H) 0.0 - 5.0 %    tHb (est) 13.9 11.5 - 16.5 g/dL    Mode 5 L NC     Date Of Collection      Time Collected      Pt Temp 37.0 C     ID [id number]     Lab [id number]     Critical(s) Notified . No Critical Values    POCT Glucose    Collection Time: 08/04/21  9:58 AM   Result Value Ref Range    Meter Glucose 112 (H) 74 - 99 mg/dL   POCT glucose    Collection Time: 08/04/21  9:59 AM   Result Value Ref Range    Glucose 112 mg/dL    QC OK? ok    Hemoglobin and Hematocrit, Blood    Collection Time: 08/04/21  1:04 PM   Result Value Ref Range    Hemoglobin 12.9 12.5 - 16.5 g/dL    Hematocrit 43.4 37.0 - 54.0 %       XR CHEST PORTABLE   Final Result   Atelectasis in the bilateral mid to lower lungs. CT ABDOMEN PELVIS W IV CONTRAST Additional Contrast? None   Final Result   1. There is wall thickening of visualized distal esophagus which may   represent esophagitis, less likely esophageal mass. Suggest correlation with   endoscopy. 2. Cholelithiasis. 3. Punctate non-obstructing left intrarenal calculus. 4. Tiny low-density liver lesions are likely small cysts although too small   to definitively characterize. 5. Diverticulosis. No acute diverticulitis seen. 6. Normal appendix. 7. There is mild lymphadenopathy involving visualized roberto. XR CHEST PORTABLE   Final Result   Suspect right infrahilar infiltrate. ASSESSMENT :      Active Problems:    COPD (chronic obstructive pulmonary disease) (HCC)    ETOH abuse    Hematemesis    Pneumonia  Resolved Problems:    * No resolved hospital problems. *    Aspiration pneumonia  Lactic acidosis    Plan :    Empiric antibiotics  N.p.o.  Surgery disease  IV hydration      Electronically signed by Alma Wade MD on 8/4/2021 at 4:15 PM    NOTE: This report was transcribed using voice recognition software.  Every effort was made to ensure accuracy; however, inadvertent transcription errors may be present

## 2021-08-04 NOTE — Clinical Note
Patient Class: Inpatient [[id number]]   REQUIRED: Diagnosis: Hematemesis Nemesis.Slimmer. 0. ICD-9-CM]   Estimated Length of Stay: Estimated stay of more than 2 midnights   Admitting Provider: Serina Lynch [[id number]]   Telemetry/Cardiac Monitoring Required?: Yes

## 2021-08-04 NOTE — PROGRESS NOTES
Date: 8/4/2021    Time: 10:51 AM    Patient Placed On BIPAP/CPAP/ Non-Invasive Ventilation? Yes    If no must comment. Facial area red/color change? No           If YES are Blister/Lesion present? No   If yes must notify nursing staff  BIPAP/CPAP skin barrier? Yes    Skin barrier type:mepilexlite     Comments: placed on in ER 12/8/50%    Nancy Lyon

## 2021-08-04 NOTE — ED PROVIDER NOTES
Patient is a 42-year-old male with history of alcohol abuse, COPD, hypertension who presents to the emergency department after vomiting tonight. Patient states that he drank half a pint of gin as well as a beer and has been drinking for 4 hours prior to arrival in the emergency department. Patient does endorse dark red blood in his vomitus. Patient denies any chest pain or shortness of breath, syncopal episode. Patient denies any falls. Patient denies any urinary or other GI symptoms. Patient states that he does drink alcohol however it is not every day. He does endorse binge drinking events drinking up to half a gallon of alcohol. He says that sometimes he will drink enough and that will last him 2 days. Patient was found by EMS and was given Zofran prior to arrival.  Patient was also found to be hypoglycemic with a blood sugar of 30 and was treated with dextrose 10% with repeat of 68 by EMS. Patient endorses periumbilical abdominal pain as well as abdominal distention. Patient is not further able to qualify. Patient does appear to be intoxicated and ROS is limited. Patient with mumbling speech. Review of Systems   Unable to perform ROS: Acuity of condition   Gastrointestinal: Positive for abdominal distention, abdominal pain and vomiting. Vomiting blood      Physical Exam  Vitals and nursing note reviewed. Constitutional:       General: He is not in acute distress. Appearance: He is ill-appearing. He is not toxic-appearing. HENT:      Head: Normocephalic and atraumatic. Mouth/Throat:      Mouth: Mucous membranes are dry. Pharynx: Oropharynx is clear. Eyes:      Extraocular Movements: Extraocular movements intact. Pupils: Pupils are equal, round, and reactive to light. Cardiovascular:      Rate and Rhythm: Regular rhythm. Tachycardia present. Pulses: Normal pulses. Heart sounds: Normal heart sounds.    Pulmonary:      Effort: Pulmonary effort is given antibiotics. Patient was treated with IV fluids and lactate was repeated. Decision was made to admit patient was discussed with hospitalist and accepted to their service. Patient was monitored in the emergency department without further change and plan was to be admitted in stable condition when bed available. Amount and/or Complexity of Data Reviewed  Clinical lab tests: reviewed  Tests in the radiology section of CPT®: reviewed  Tests in the medicine section of CPT®: reviewed       -------------------------------------------- PAST HISTORY ---------------------------------------------  Past Medical History:  has a past medical history of Arthritis, Hypertension, and Tobacco abuse. Past Surgical History:  has no past surgical history on file. Social History:  reports that he has been smoking cigarettes. He has a 12.50 pack-year smoking history. He has never used smokeless tobacco. He reports current alcohol use. He reports that he does not use drugs. Family History: family history is not on file. The patients home medications have been reviewed. Allergies: Patient has no known allergies.     -------------------------------------------------- RESULTS -------------------------------------------------    LABS:  Results for orders placed or performed during the hospital encounter of 08/04/21   CBC auto differential   Result Value Ref Range    WBC 19.9 (H) 4.5 - 11.5 E9/L    RBC 6.23 (H) 3.80 - 5.80 E12/L    Hemoglobin 14.4 12.5 - 16.5 g/dL    Hematocrit 49.3 37.0 - 54.0 %    MCV 79.1 (L) 80.0 - 99.9 fL    MCH 23.1 (L) 26.0 - 35.0 pg    MCHC 29.2 (L) 32.0 - 34.5 %    RDW 14.4 11.5 - 15.0 fL    Platelets 935 948 - 626 E9/L    MPV 10.9 7.0 - 12.0 fL    Neutrophils % 93.0 (H) 43.0 - 80.0 %    Lymphocytes % 5.2 (L) 20.0 - 42.0 %    Monocytes % 1.7 (L) 2.0 - 12.0 %    Eosinophils % 31.8 (H) 0.0 - 6.0 %    Basophils % 0.2 0.0 - 2.0 %    Neutrophils Absolute 18.51 (H) 1.80 - 7.30 E9/L Lymphocytes Absolute 1.00 (L) 1.50 - 4.00 E9/L    Monocytes Absolute 0.40 0.10 - 0.95 E9/L    Eosinophils Absolute 0.00 (L) 0.05 - 0.50 E9/L    Basophils Absolute 0.00 0.00 - 0.20 E9/L    Anisocytosis 1+     Polychromasia 1+     Poikilocytes 3+     Schistocytes 1+     Acanthocytes 1+     Franca Cells 3+     Ovalocytes 2+     Rouleaux 1+    Comprehensive Metabolic Panel   Result Value Ref Range    Sodium 141 132 - 146 mmol/L    Potassium 3.9 3.5 - 5.0 mmol/L    Chloride 94 (L) 98 - 107 mmol/L    CO2 21 (L) 22 - 29 mmol/L    Anion Gap 26 (H) 7 - 16 mmol/L    Glucose 89 74 - 99 mg/dL    BUN 26 (H) 6 - 23 mg/dL    CREATININE 1.4 (H) 0.7 - 1.2 mg/dL    GFR Non-African American >60 >=60 mL/min/1.73    GFR African American >60     Calcium 9.6 8.6 - 10.2 mg/dL    Total Protein 8.2 6.4 - 8.3 g/dL    Albumin 4.6 3.5 - 5.2 g/dL    Total Bilirubin 0.5 0.0 - 1.2 mg/dL    Alkaline Phosphatase 100 40 - 129 U/L    ALT 18 0 - 40 U/L    AST 28 0 - 39 U/L   Troponin   Result Value Ref Range    Troponin, High Sensitivity 39 (H) 0 - 11 ng/L   Serum Drug Screen   Result Value Ref Range    Ethanol Lvl 29 mg/dL    Acetaminophen Level <5.0 (L) 10.0 - 07.5 mcg/mL    Salicylate, Serum <2.5 0.0 - 30.0 mg/dL    TCA Scrn NEGATIVE Cutoff:300 ng/mL   Urine Drug Screen   Result Value Ref Range    Amphetamine Screen, Urine NOT DETECTED Negative <1000 ng/mL    Barbiturate Screen, Ur NOT DETECTED Negative < 200 ng/mL    Benzodiazepine Screen, Urine NOT DETECTED Negative < 200 ng/mL    Cannabinoid Scrn, Ur NOT DETECTED Negative < 50ng/mL    Cocaine Metabolite Screen, Urine NOT DETECTED Negative < 300 ng/mL    Opiate Scrn, Ur NOT DETECTED Negative < 300ng/mL    PCP Screen, Urine NOT DETECTED Negative < 25 ng/mL    Methadone Screen, Urine NOT DETECTED Negative <300 ng/mL    Oxycodone Urine NOT DETECTED Negative <100 ng/mL    FENTANYL SCREEN, URINE NOT DETECTED Negative <1 ng/mL    Drug Screen Comment: see below    Lipase   Result Value Ref Range    Lipase 7 (L) 13 - 60 U/L   Lactic Acid, Plasma   Result Value Ref Range    Lactic Acid 10.3 (HH) 0.5 - 2.2 mmol/L   Urinalysis   Result Value Ref Range    Color, UA Yellow Straw/Yellow    Clarity, UA Clear Clear    Glucose, Ur Negative Negative mg/dL    Bilirubin Urine Negative Negative    Ketones, Urine 15 (A) Negative mg/dL    Specific Gravity, UA >=1.030 1.005 - 1.030    Blood, Urine TRACE-INTACT Negative    pH, UA 5.0 5.0 - 9.0    Protein, UA Negative Negative mg/dL    Urobilinogen, Urine 0.2 <2.0 E.U./dL    Nitrite, Urine Negative Negative    Leukocyte Esterase, Urine Negative Negative   Magnesium   Result Value Ref Range    Magnesium 2.5 1.6 - 2.6 mg/dL   PHOSPHORUS   Result Value Ref Range    Phosphorus 6.8 (H) 2.5 - 4.5 mg/dL   Lactic Acid, Plasma   Result Value Ref Range    Lactic Acid 4.4 (HH) 0.5 - 2.2 mmol/L   Troponin   Result Value Ref Range    Troponin, High Sensitivity 50 (H) 0 - 11 ng/L   Lactate, Sepsis   Result Value Ref Range    Lactic Acid, Sepsis 4.4 (HH) 0.5 - 1.9 mmol/L   Lactate, Sepsis   Result Value Ref Range    Lactic Acid, Sepsis 4.7 (HH) 0.5 - 1.9 mmol/L   Microscopic Urinalysis   Result Value Ref Range    Hyaline Casts, UA 0-2 0 - 2 /LPF    WBC, UA 0-1 0 - 5 /HPF    RBC, UA 1-3 0 - 2 /HPF    Bacteria, UA FEW (A) None Seen /HPF   Troponin   Result Value Ref Range    Troponin, High Sensitivity 45 (H) 0 - 11 ng/L   Hemoglobin and Hematocrit, Blood   Result Value Ref Range    Hemoglobin 12.9 12.5 - 16.5 g/dL    Hematocrit 43.4 37.0 - 54.0 %   Procalcitonin   Result Value Ref Range    Procalcitonin 2.19 (H) 0.00 - 0.08 ng/mL   Ferritin   Result Value Ref Range    Ferritin 157 ng/mL   Blood Gas, Arterial   Result Value Ref Range    Date Analyzed 20210804     Time Analyzed 0845     Source: Blood Arterial     pH, Blood Gas 7.307 (L) 7.350 - 7.450    PCO2 43.6 35.0 - 45.0 mmHg    PO2 67.9 (L) 75.0 - 100.0 mmHg    HCO3 21.3 (L) 22.0 - 26.0 mmol/L    B.E. -4.9 (L) -3.0 - 3.0 mmol/L    O2 Sat 91.9 (L) 92.0 - 98.5 %    O2Hb 90.6 (L) 94.0 - 97.0 %    COHb 1.0 0.0 - 1.5 %    MetHb 0.4 0.0 - 1.5 %    O2 Content 17.7 mL/dL    HHb 8.0 (H) 0.0 - 5.0 %    tHb (est) 13.9 11.5 - 16.5 g/dL    Mode 5 L NC     Date Of Collection      Time Collected      Pt Temp 37.0 C     ID [id number]     Lab [id number]     Critical(s) Notified . No Critical Values    POCT glucose   Result Value Ref Range    Glucose 112 mg/dL    QC OK? ok    POCT Glucose   Result Value Ref Range    Meter Glucose 112 (H) 74 - 99 mg/dL   EKG 12 Lead   Result Value Ref Range    Ventricular Rate 102 BPM    Atrial Rate 102 BPM    P-R Interval 166 ms    QRS Duration 84 ms    Q-T Interval 376 ms    QTc Calculation (Bazett) 490 ms    P Axis 81 degrees    R Axis 73 degrees    T Axis 49 degrees       RADIOLOGY:  XR CHEST PORTABLE   Final Result   Atelectasis in the bilateral mid to lower lungs. CT ABDOMEN PELVIS W IV CONTRAST Additional Contrast? None   Final Result   1. There is wall thickening of visualized distal esophagus which may   represent esophagitis, less likely esophageal mass. Suggest correlation with   endoscopy. 2. Cholelithiasis. 3. Punctate non-obstructing left intrarenal calculus. 4. Tiny low-density liver lesions are likely small cysts although too small   to definitively characterize. 5. Diverticulosis. No acute diverticulitis seen. 6. Normal appendix. 7. There is mild lymphadenopathy involving visualized roberto. XR CHEST PORTABLE   Final Result   Suspect right infrahilar infiltrate. EKG:  This EKG is signed and interpreted by me. Rate: 102  Rhythm: Sinus  Interpretation: sinus tachycardia  Comparison: stable as compared to patient's most recent EKG      ------------------------- NURSING NOTES AND VITALS REVIEWED ---------------------------  Date / Time Roomed:  8/4/2021  3:08 AM  ED Bed Assignment:  20/20    The nursing notes within the ED encounter and vital signs as below have been reviewed.      Patient oximetry    This patient has remained hemodynamically stable during their ED course. Diagnosis:  1. Aspiration pneumonia of right lower lobe, unspecified aspiration pneumonia type ([facility] [address]    2. PAO (acute kidney injury) ([address]    3. Lactic acidosis    4. Leukocytosis, unspecified type      Disposition:  Patient's disposition: Admit to telemetry  Patient's condition is stable. 8/4/21, 6:09 AM EDT. This note is prepared by Rosa Isela Grace MD -PGY- 2       Rosa Isela Grace MD  Resident  08/04/21 1550  ATTENDING PROVIDER ATTESTATION:     I have personally performed and/or participated in the history, exam, medical decision making, and procedures and agree with all pertinent clinical information. I have also reviewed and agree with the past medical, family and social history unless otherwise noted. I have discussed this patient in detail with the resident, and provided the instruction and education regarding weakness. My findings/Plan: I was the primary provider for patient patient presenting here because of feeling weak. Patient reportedly vomited and reportedly had blood in his vomit. Patient reported he did not pass out he did fall to the ground but did not hit his head. Patient reporting some abdominal pain mainly lower abdomen. Patient was also to be hypoglycemic patient was given D10 prior to arrival.  Patient reporting no chest pains. He is awake alert oriented x3 heart exam normal lungs he does have some wheezes on exam abdomen he is mainly tenderness lower abdomen he is moving all extremities pulses are intact distally. Labs are reviewed as well as chest x-ray. Patient underwent CT. Lactic acid was also noted patient was given 2 L of IV fluids he was also medicated with breathing treatments as well. Patient rechecked several times with family at bedside they were made aware of findings and plan. Patient will be admitted to monitored bed. We did speak to on-call internal medicine. Patient will be admitted to intermediate bed. Patient was given IV antibiotics for suspected aspiration. Pulse ox with supplemental oxygen within normal limits.        Fausto Ha MD  08/04/21 2003       Fausto Ha MD  08/04/21 2004

## 2021-08-05 ENCOUNTER — ANESTHESIA (OUTPATIENT)
Dept: ENDOSCOPY | Age: 71
DRG: 177 | End: 2021-08-05
Payer: MEDICARE

## 2021-08-05 ENCOUNTER — ANESTHESIA EVENT (OUTPATIENT)
Dept: ENDOSCOPY | Age: 71
DRG: 177 | End: 2021-08-05
Payer: MEDICARE

## 2021-08-05 VITALS — OXYGEN SATURATION: 97 % | SYSTOLIC BLOOD PRESSURE: 173 MMHG | DIASTOLIC BLOOD PRESSURE: 99 MMHG

## 2021-08-05 LAB
ANION GAP SERPL CALCULATED.3IONS-SCNC: 4 MMOL/L (ref 7–16)
BUN BLDV-MCNC: 21 MG/DL (ref 6–23)
CALCIUM SERPL-MCNC: 8.7 MG/DL (ref 8.6–10.2)
CHLORIDE BLD-SCNC: 110 MMOL/L (ref 98–107)
CO2: 31 MMOL/L (ref 22–29)
CREAT SERPL-MCNC: 0.9 MG/DL (ref 0.7–1.2)
GFR AFRICAN AMERICAN: >60
GFR NON-AFRICAN AMERICAN: >60 ML/MIN/1.73
GLUCOSE BLD-MCNC: 86 MG/DL (ref 74–99)
HCT VFR BLD CALC: 35.7 % (ref 37–54)
HCT VFR BLD CALC: 35.8 % (ref 37–54)
HCT VFR BLD CALC: 40.6 % (ref 37–54)
HEMOGLOBIN: 10.5 G/DL (ref 12.5–16.5)
HEMOGLOBIN: 10.5 G/DL (ref 12.5–16.5)
HEMOGLOBIN: 11.7 G/DL (ref 12.5–16.5)
IRON SATURATION: 18 % (ref 20–55)
IRON: 45 MCG/DL (ref 59–158)
LACTIC ACID: 1.1 MMOL/L (ref 0.5–2.2)
POTASSIUM REFLEX MAGNESIUM: 4 MMOL/L (ref 3.5–5)
PROCALCITONIN: 2.84 NG/ML (ref 0–0.08)
SODIUM BLD-SCNC: 145 MMOL/L (ref 132–146)
TOTAL IRON BINDING CAPACITY: 248 MCG/DL (ref 250–450)

## 2021-08-05 PROCEDURE — 85018 HEMOGLOBIN: CPT

## 2021-08-05 PROCEDURE — 7100000001 HC PACU RECOVERY - ADDTL 15 MIN: Performed by: SURGERY

## 2021-08-05 PROCEDURE — 6360000002 HC RX W HCPCS: Performed by: SURGERY

## 2021-08-05 PROCEDURE — 88342 IMHCHEM/IMCYTCHM 1ST ANTB: CPT

## 2021-08-05 PROCEDURE — 6360000002 HC RX W HCPCS: Performed by: INTERNAL MEDICINE

## 2021-08-05 PROCEDURE — 36415 COLL VENOUS BLD VENIPUNCTURE: CPT

## 2021-08-05 PROCEDURE — 2500000003 HC RX 250 WO HCPCS: Performed by: STUDENT IN AN ORGANIZED HEALTH CARE EDUCATION/TRAINING PROGRAM

## 2021-08-05 PROCEDURE — 0DB68ZX EXCISION OF STOMACH, VIA NATURAL OR ARTIFICIAL OPENING ENDOSCOPIC, DIAGNOSTIC: ICD-10-PCS | Performed by: SURGERY

## 2021-08-05 PROCEDURE — 6370000000 HC RX 637 (ALT 250 FOR IP): Performed by: INTERNAL MEDICINE

## 2021-08-05 PROCEDURE — C9113 INJ PANTOPRAZOLE SODIUM, VIA: HCPCS | Performed by: SURGERY

## 2021-08-05 PROCEDURE — 3700000000 HC ANESTHESIA ATTENDED CARE: Performed by: SURGERY

## 2021-08-05 PROCEDURE — 94640 AIRWAY INHALATION TREATMENT: CPT

## 2021-08-05 PROCEDURE — 83605 ASSAY OF LACTIC ACID: CPT

## 2021-08-05 PROCEDURE — 6360000002 HC RX W HCPCS: Performed by: NURSE ANESTHETIST, CERTIFIED REGISTERED

## 2021-08-05 PROCEDURE — 2060000000 HC ICU INTERMEDIATE R&B

## 2021-08-05 PROCEDURE — 80048 BASIC METABOLIC PNL TOTAL CA: CPT

## 2021-08-05 PROCEDURE — 3609012400 HC EGD TRANSORAL BIOPSY SINGLE/MULTIPLE: Performed by: SURGERY

## 2021-08-05 PROCEDURE — 88305 TISSUE EXAM BY PATHOLOGIST: CPT

## 2021-08-05 PROCEDURE — 84145 PROCALCITONIN (PCT): CPT

## 2021-08-05 PROCEDURE — 6370000000 HC RX 637 (ALT 250 FOR IP): Performed by: SURGERY

## 2021-08-05 PROCEDURE — C9113 INJ PANTOPRAZOLE SODIUM, VIA: HCPCS | Performed by: INTERNAL MEDICINE

## 2021-08-05 PROCEDURE — 2709999900 HC NON-CHARGEABLE SUPPLY: Performed by: SURGERY

## 2021-08-05 PROCEDURE — 2580000003 HC RX 258: Performed by: SURGERY

## 2021-08-05 PROCEDURE — 2580000003 HC RX 258: Performed by: INTERNAL MEDICINE

## 2021-08-05 PROCEDURE — 2580000003 HC RX 258: Performed by: NURSE ANESTHETIST, CERTIFIED REGISTERED

## 2021-08-05 PROCEDURE — 3700000001 HC ADD 15 MINUTES (ANESTHESIA): Performed by: SURGERY

## 2021-08-05 PROCEDURE — 0DB58ZX EXCISION OF ESOPHAGUS, VIA NATURAL OR ARTIFICIAL OPENING ENDOSCOPIC, DIAGNOSTIC: ICD-10-PCS | Performed by: SURGERY

## 2021-08-05 PROCEDURE — 2500000003 HC RX 250 WO HCPCS: Performed by: NURSE ANESTHETIST, CERTIFIED REGISTERED

## 2021-08-05 PROCEDURE — 83540 ASSAY OF IRON: CPT

## 2021-08-05 PROCEDURE — 94660 CPAP INITIATION&MGMT: CPT

## 2021-08-05 PROCEDURE — 83550 IRON BINDING TEST: CPT

## 2021-08-05 PROCEDURE — 2500000003 HC RX 250 WO HCPCS: Performed by: INTERNAL MEDICINE

## 2021-08-05 PROCEDURE — 85014 HEMATOCRIT: CPT

## 2021-08-05 PROCEDURE — 7100000000 HC PACU RECOVERY - FIRST 15 MIN: Performed by: SURGERY

## 2021-08-05 RX ORDER — ALBUTEROL SULFATE 2.5 MG/3ML
2.5 SOLUTION RESPIRATORY (INHALATION) EVERY 6 HOURS PRN
Status: DISCONTINUED | OUTPATIENT
Start: 2021-08-05 | End: 2021-08-09 | Stop reason: HOSPADM

## 2021-08-05 RX ORDER — ARFORMOTEROL TARTRATE 15 UG/2ML
15 SOLUTION RESPIRATORY (INHALATION) 2 TIMES DAILY
Status: DISCONTINUED | OUTPATIENT
Start: 2021-08-05 | End: 2021-08-09 | Stop reason: HOSPADM

## 2021-08-05 RX ORDER — SODIUM CHLORIDE 9 MG/ML
INJECTION, SOLUTION INTRAVENOUS CONTINUOUS PRN
Status: DISCONTINUED | OUTPATIENT
Start: 2021-08-05 | End: 2021-08-05 | Stop reason: SDUPTHER

## 2021-08-05 RX ORDER — METHYLPREDNISOLONE SODIUM SUCCINATE 40 MG/ML
40 INJECTION, POWDER, LYOPHILIZED, FOR SOLUTION INTRAMUSCULAR; INTRAVENOUS EVERY 8 HOURS
Status: COMPLETED | OUTPATIENT
Start: 2021-08-05 | End: 2021-08-06

## 2021-08-05 RX ORDER — HYDRALAZINE HYDROCHLORIDE 20 MG/ML
10 INJECTION INTRAMUSCULAR; INTRAVENOUS EVERY 6 HOURS PRN
Status: DISCONTINUED | OUTPATIENT
Start: 2021-08-05 | End: 2021-08-09 | Stop reason: HOSPADM

## 2021-08-05 RX ORDER — PROPOFOL 10 MG/ML
INJECTION, EMULSION INTRAVENOUS CONTINUOUS PRN
Status: DISCONTINUED | OUTPATIENT
Start: 2021-08-05 | End: 2021-08-05 | Stop reason: SDUPTHER

## 2021-08-05 RX ORDER — LISINOPRIL 5 MG/1
5 TABLET ORAL DAILY
Status: DISCONTINUED | OUTPATIENT
Start: 2021-08-05 | End: 2021-08-09 | Stop reason: HOSPADM

## 2021-08-05 RX ORDER — LABETALOL HYDROCHLORIDE 5 MG/ML
10 INJECTION, SOLUTION INTRAVENOUS EVERY 4 HOURS PRN
Status: DISCONTINUED | OUTPATIENT
Start: 2021-08-05 | End: 2021-08-09 | Stop reason: HOSPADM

## 2021-08-05 RX ORDER — BUDESONIDE 0.5 MG/2ML
0.5 INHALANT ORAL 2 TIMES DAILY
Status: DISCONTINUED | OUTPATIENT
Start: 2021-08-05 | End: 2021-08-09 | Stop reason: HOSPADM

## 2021-08-05 RX ADMIN — METHYLPREDNISOLONE SODIUM SUCCINATE 40 MG: 40 INJECTION, POWDER, FOR SOLUTION INTRAMUSCULAR; INTRAVENOUS at 21:57

## 2021-08-05 RX ADMIN — METHYLPREDNISOLONE SODIUM SUCCINATE 40 MG: 40 INJECTION, POWDER, FOR SOLUTION INTRAMUSCULAR; INTRAVENOUS at 13:23

## 2021-08-05 RX ADMIN — AMPICILLIN SODIUM AND SULBACTAM SODIUM 3000 MG: 2; 1 INJECTION, POWDER, FOR SOLUTION INTRAMUSCULAR; INTRAVENOUS at 18:25

## 2021-08-05 RX ADMIN — PANTOPRAZOLE SODIUM 40 MG: 40 INJECTION, POWDER, FOR SOLUTION INTRAVENOUS at 07:00

## 2021-08-05 RX ADMIN — ACETAMINOPHEN 650 MG: 325 TABLET ORAL at 21:57

## 2021-08-05 RX ADMIN — ATORVASTATIN CALCIUM 20 MG: 20 TABLET, FILM COATED ORAL at 13:23

## 2021-08-05 RX ADMIN — ARFORMOTEROL TARTRATE 15 MCG: 15 SOLUTION RESPIRATORY (INHALATION) at 08:45

## 2021-08-05 RX ADMIN — Medication 10 ML: at 21:57

## 2021-08-05 RX ADMIN — THIAMINE HYDROCHLORIDE 100 MG: 100 INJECTION, SOLUTION INTRAMUSCULAR; INTRAVENOUS at 13:23

## 2021-08-05 RX ADMIN — LIDOCAINE HYDROCHLORIDE 20 MG: 10 INJECTION, SOLUTION INFILTRATION; PERINEURAL at 11:49

## 2021-08-05 RX ADMIN — Medication 10 ML: at 13:30

## 2021-08-05 RX ADMIN — Medication 1 TABLET: at 13:30

## 2021-08-05 RX ADMIN — LABETALOL HYDROCHLORIDE 10 MG: 5 INJECTION INTRAVENOUS at 22:17

## 2021-08-05 RX ADMIN — SODIUM CHLORIDE, PRESERVATIVE FREE 10 ML: 5 INJECTION INTRAVENOUS at 07:00

## 2021-08-05 RX ADMIN — BUDESONIDE 500 MCG: 0.5 SUSPENSION RESPIRATORY (INHALATION) at 08:55

## 2021-08-05 RX ADMIN — SODIUM CHLORIDE: 9 INJECTION, SOLUTION INTRAVENOUS at 09:17

## 2021-08-05 RX ADMIN — BUDESONIDE 500 MCG: 0.5 SUSPENSION RESPIRATORY (INHALATION) at 20:51

## 2021-08-05 RX ADMIN — AMPICILLIN SODIUM AND SULBACTAM SODIUM 3000 MG: 2; 1 INJECTION, POWDER, FOR SOLUTION INTRAMUSCULAR; INTRAVENOUS at 23:15

## 2021-08-05 RX ADMIN — AMPICILLIN SODIUM AND SULBACTAM SODIUM 3000 MG: 2; 1 INJECTION, POWDER, FOR SOLUTION INTRAMUSCULAR; INTRAVENOUS at 13:23

## 2021-08-05 RX ADMIN — ACETAMINOPHEN 650 MG: 325 TABLET ORAL at 14:49

## 2021-08-05 RX ADMIN — SODIUM CHLORIDE: 9 INJECTION, SOLUTION INTRAVENOUS at 11:46

## 2021-08-05 RX ADMIN — AMPICILLIN SODIUM AND SULBACTAM SODIUM 3000 MG: 2; 1 INJECTION, POWDER, FOR SOLUTION INTRAMUSCULAR; INTRAVENOUS at 06:20

## 2021-08-05 RX ADMIN — ENOXAPARIN SODIUM 40 MG: 40 INJECTION SUBCUTANEOUS at 13:23

## 2021-08-05 RX ADMIN — ONDANSETRON 4 MG: 2 INJECTION INTRAMUSCULAR; INTRAVENOUS at 21:57

## 2021-08-05 RX ADMIN — HYDRALAZINE HYDROCHLORIDE 10 MG: 20 INJECTION INTRAMUSCULAR; INTRAVENOUS at 23:39

## 2021-08-05 RX ADMIN — ONDANSETRON 4 MG: 2 INJECTION INTRAMUSCULAR; INTRAVENOUS at 14:49

## 2021-08-05 RX ADMIN — FOLIC ACID 1 MG: 5 INJECTION, SOLUTION INTRAMUSCULAR; INTRAVENOUS; SUBCUTANEOUS at 09:17

## 2021-08-05 RX ADMIN — SODIUM CHLORIDE, PRESERVATIVE FREE 10 ML: 5 INJECTION INTRAVENOUS at 21:59

## 2021-08-05 RX ADMIN — PROPOFOL 100 MCG/KG/MIN: 10 INJECTION, EMULSION INTRAVENOUS at 11:49

## 2021-08-05 RX ADMIN — AMLODIPINE BESYLATE 5 MG: 5 TABLET ORAL at 09:17

## 2021-08-05 RX ADMIN — LISINOPRIL 5 MG: 5 TABLET ORAL at 14:49

## 2021-08-05 RX ADMIN — PANTOPRAZOLE SODIUM 40 MG: 40 INJECTION, POWDER, FOR SOLUTION INTRAVENOUS at 21:57

## 2021-08-05 RX ADMIN — ARFORMOTEROL TARTRATE 15 MCG: 15 SOLUTION RESPIRATORY (INHALATION) at 20:51

## 2021-08-05 ASSESSMENT — PAIN SCALES - GENERAL
PAINLEVEL_OUTOF10: 0
PAINLEVEL_OUTOF10: 7
PAINLEVEL_OUTOF10: 3
PAINLEVEL_OUTOF10: 5
PAINLEVEL_OUTOF10: 0

## 2021-08-05 ASSESSMENT — PAIN DESCRIPTION - DESCRIPTORS: DESCRIPTORS: BURNING

## 2021-08-05 ASSESSMENT — COPD QUESTIONNAIRES: CAT_SEVERITY: MILD

## 2021-08-05 ASSESSMENT — PAIN DESCRIPTION - ONSET: ONSET: GRADUAL

## 2021-08-05 ASSESSMENT — PAIN DESCRIPTION - PAIN TYPE: TYPE: ACUTE PAIN

## 2021-08-05 ASSESSMENT — PAIN DESCRIPTION - LOCATION: LOCATION: ABDOMEN

## 2021-08-05 ASSESSMENT — PAIN DESCRIPTION - FREQUENCY: FREQUENCY: CONTINUOUS

## 2021-08-05 ASSESSMENT — ENCOUNTER SYMPTOMS: SHORTNESS OF BREATH: 1

## 2021-08-05 ASSESSMENT — PAIN DESCRIPTION - ORIENTATION: ORIENTATION: MID

## 2021-08-05 NOTE — OP NOTE
Erik Martinez  YOB: 1950  [id number]    Pre-operative Diagnosis: UGI bleed    Post-operative Diagnosis: Mild gastritis and duodenitis, small type I hiatal hernia, LA class D esophagitis    Procedure: EGD with biopsies    Anesthesia: LMAC    Surgeon: Debbie Barrera MD    Assistant: None    Estimated Blood Loss: none    Complications: none    Specimens: antrum, esophagus    Procedure:  Pt was taken to the endoscopy suite and placed on the endoscopy table in a left lateral decubitus position. LMAC anesthesia was administered and a bite block was inserted. A lubricated gastroscope was inserted into the oropharynx and advanced into the esophagus. The esophagus was inspected throughout its length. There were no varices. There was severe esophagitis, LA class D. The GE junction was located at 39 cm. The stomach was entered and insufflated. The antrum was mildly inflamed. Biopsies were taken for H Pylori. The pylorus was intubated. The duodenal bulb was inflamed. The second portion was normal..  The scope was pulled back into the antrum and retroflexed. The angle of the stomach was normal.  The proximal greater and lesser curves were normal.  The fundus was normal.  At the GE junction, there was a small type I hiatal hernia. The stomach was deflated and the scope was withdrawn and removed. The patient tolerated the procedure well.     Impression: gastritis, duodenitis, severe esophagitis, small type I hiatal hernia    Plan:PPI  bland diet      Debbie Barrera MD

## 2021-08-05 NOTE — PLAN OF CARE
Problem: Falls - Risk of:  Goal: Will remain free from falls  Description: Will remain free from falls  Outcome: Ongoing  Goal: Absence of physical injury  Description: Absence of physical injury  Outcome: Ongoing     Problem: Pain:  Goal: Pain level will decrease  Description: Pain level will decrease  Outcome: Ongoing  Goal: Control of acute pain  Description: Control of acute pain  Outcome: Ongoing  Goal: Control of chronic pain  Description: Control of chronic pain  Outcome: Ongoing     Problem:  Activity:  Goal: Fatigue will decrease  Description: Fatigue will decrease  Outcome: Ongoing     Problem: Cardiac:  Goal: Hemodynamic stability will improve  Description: Hemodynamic stability will improve  Outcome: Ongoing     Problem: Coping:  Goal: Level of anxiety will decrease  Description: Level of anxiety will decrease  Outcome: Ongoing     Problem: Coping:  Goal: Ability to cope will improve  Description: Ability to cope will improve  Outcome: Ongoing     Problem: Coping:  Goal: Ability to establish a method of communication will improve  Description: Ability to establish a method of communication will improve  Outcome: Ongoing     Problem: Nutritional:  Goal: Consumption of the prescribed amount of daily calories will improve  Description: Consumption of the prescribed amount of daily calories will improve  Outcome: Ongoing     Problem: Respiratory:  Goal: Ability to maintain a clear airway will improve  Description: Ability to maintain a clear airway will improve  Outcome: Ongoing  Goal: Ability to maintain adequate ventilation will improve  Description: Ability to maintain adequate ventilation will improve  Outcome: Ongoing  Goal: Complications related to the disease process, condition or treatment will be avoided or minimized  Description: Complications related to the disease process, condition or treatment will be avoided or minimized  Outcome: Ongoing     Problem: Skin Integrity:  Goal: Risk for impaired skin integrity will decrease  Description: Risk for impaired skin integrity will decrease  Outcome: Ongoing

## 2021-08-05 NOTE — CARE COORDINATION
Met with pt at bedside to discuss discharge / transition of care plan. Pt reports from home alone; independent of all ADL; denies PCP, agreeable for meds to beds; notified charge RN Maico Mcduffie pt will need set-up with clinic appointment to establish PCP at discharge; denies DME or needs; independent of all ADL; discharge plan is to return home with daughter or grandchild to transport once medically stable. Pt requesting something to drink; currently npo; checked with endo they will be sending for pt shortly; updated pt at bedside, diet once he returns, pt agreeable.

## 2021-08-05 NOTE — PROGRESS NOTES
GENERAL SURGERY  DAILY PROGRESS NOTE  8/5/2021    CHIEF COMPLAINT:  Chief Complaint   Patient presents with    Emesis     Emesis/ throat pain starting 2200 last evening/ +ETOH states \"5 beers and a couple shots today\"; 4mg zofran IV PTA    Hypoglycemia     patient initially called for emesis found to have blood sugar 30 per EMS, given D10 recheck 68        SUBJECTIVE:  Overnight, patient weaned off oxygen. Found on room air this morning, states he is feeling much better. Denies any abdominal pain currently, and has had no episodes of vomiting. States he may have had a few episodes of blood-streaked vomit in the past, but he hasn't really paid much attention to it. Tolerating diet -- Diet NPO Exceptions are: Ice Chips, Sips of Water with Meds    OBJECTIVE:  BP (!) 177/104   Pulse 94   Temp 96.9 °F (36.1 °C) (Temporal)   Resp 20   Ht 5' 6\" (1.676 m)   Wt 140 lb (63.5 kg)   SpO2 91%   BMI 22.60 kg/m²     GENERAL:  NAD. A&Ox3. LUNGS:  No increased work of breathing. CARDIOVASCULAR: RR  ABDOMEN:  Soft, mildly distended, non-tender. No guarding, rigidity, rebound. ASSESSMENT/PLAN:  65 y.o. male with episode of hematemesis following an episode of binge drinking, likely due Peggy-Ward tear. Patient's respiratory status has improved, plan for EGD today.      - EGD today 8/5 with Dr. Mimi Borja  - Jose Womack, can advance diet after scope  - continue to monitor H/H  - PPI/Carafate    Kyle Cedeño MD  Surgery Resident PGY-2  8/5/2021  11:20 AM

## 2021-08-05 NOTE — ANESTHESIA PRE PROCEDURE
Department of Anesthesiology  Preprocedure Note       Name:  Orlando Cartagena   Age:  65 y.o.  :  1950                                          MRN:  [id number]         Date:  2021      Surgeon: Abelardo Macdonald):  Thom Ramírez MD    Procedure: Procedure(s):  EGD DIAGNOSTIC ONLY    Medications prior to admission:   Prior to Admission medications    Not on File       Current medications:    Current Facility-Administered Medications   Medication Dose Route Frequency Provider Last Rate Last Admin    budesonide (PULMICORT) nebulizer suspension 500 mcg  0.5 mg Nebulization BID Marilyn Brice MD   500 mcg at 21    And    Arformoterol Tartrate (BROVANA) nebulizer solution 15 mcg  15 mcg Nebulization BID Marilyn Brice MD   15 mcg at 21 0845    methylPREDNISolone sodium (SOLU-MEDROL) injection 40 mg  40 mg Intravenous Q8H Josh Castellon MD        albuterol (PROVENTIL) nebulizer solution 2.5 mg  2.5 mg Nebulization Q6H PRN Filipe Aguila MD        enoxaparin (LOVENOX) injection 40 mg  40 mg Subcutaneous Daily Filipe Aguila MD        folic acid injection 1 mg  1 mg Intravenous Daily Elke Liang MD   1 mg at 21 0917    amLODIPine (NORVASC) tablet 5 mg  5 mg Oral Daily Marilyn Brice MD   5 mg at 21 0112    atorvastatin (LIPITOR) tablet 20 mg  20 mg Oral Daily Marilyn Brice MD        ampicillin-sulbactam (UNASYN) 3000 mg ivpb minibag  3,000 mg Intravenous Q6H Marilyn Brice MD   Stopped at 21 0654    0.9 % sodium chloride infusion   Intravenous Continuous Marilyn Brice  mL/hr at 21 0917 New Bag at 21 0917    sodium chloride flush 0.9 % injection 5-40 mL  5-40 mL Intravenous 2 times per day Marilyn Brice MD   10 mL at 21 2231    sodium chloride flush 0.9 % injection 5-40 mL  5-40 mL Intravenous PRN Marilyn Brice MD        0.9 % sodium chloride infusion  25 mL Intravenous PRN Nathan Snyder Bang Dang MD        ondansetron (ZOFRAN-ODT) disintegrating tablet 4 mg  4 mg Oral Q8H PRN Angie Houston MD        Or    ondansetron [facility]) injection 4 mg  4 mg Intravenous Q6H PRN Angie Houston MD   4 mg at 08/04/21 0745    acetaminophen (TYLENOL) tablet 650 mg  650 mg Oral Q6H PRN Angie Houston MD   650 mg at 08/04/21 2355    Or    acetaminophen (TYLENOL) suppository 650 mg  650 mg Rectal Q6H PRN Angie Houston MD   650 mg at 08/04/21 0946    pantoprazole (PROTONIX) injection 40 mg  40 mg Intravenous Q12H Angie Houston MD   40 mg at 08/05/21 0700    And    sodium chloride (PF) 0.9 % injection 10 mL  10 mL Intravenous Q12H Angie Houston MD   10 mL at 08/05/21 0700    LORazepam (ATIVAN) tablet 1 mg  1 mg Oral Q1H PRN Angie Houston MD        Or    LORazepam (ATIVAN) injection 1 mg  1 mg Intravenous Q1H PRN Angie Houston MD        Or    LORazepam (ATIVAN) tablet 2 mg  2 mg Oral Q1H PRN Angie Houston MD        Or    LORazepam (ATIVAN) injection 2 mg  2 mg Intravenous Q1H PRN Angie Houston MD   2 mg at 08/04/21 0740    Or    LORazepam (ATIVAN) tablet 3 mg  3 mg Oral Q1H PRN Angie Houston MD        Or    LORazepam (ATIVAN) injection 3 mg  3 mg Intravenous Q1H PRN Angie Houston MD        Or    LORazepam (ATIVAN) tablet 4 mg  4 mg Oral Q1H PRN Angie Houston MD        Or    LORazepam (ATIVAN) injection 4 mg  4 mg Intravenous Q1H PRN Angie Houston MD        multivitamin 1 tablet  1 tablet Oral Daily Angie Houston MD        thiamine (B-1) injection 100 mg  100 mg Intravenous Daily Angie Houston MD   100 mg at 08/04/21 0380       Allergies:  No Known Allergies    Problem List:    Patient Active Problem List   Diagnosis Code    Dyspnea R06.00    COPD exacerbation ([facility] [address] J44.1    Essential hypertension I10    Respiratory distress R06.03    Acute respiratory failure with hypoxia (HCC) J96.01    Gallstones K80.20    COPD (chronic obstructive pulmonary disease) ([facility]) J44.9    ETOH abuse F10.10    Hematemesis K92.0    Pneumonia J18.9       Past Medical History:        Diagnosis Date    Arthritis     Hypertension     Tobacco abuse        Past Surgical History:  History reviewed. No pertinent surgical history. Social History:    Social History     Tobacco Use    Smoking status: Current Every Day Smoker     Packs/day: 0.25     Years: 50.00     Pack years: 12.50     Types: Cigarettes    Smokeless tobacco: Never Used   Substance Use Topics    Alcohol use: Yes     Comment: social                                Ready to quit: Not Answered  Counseling given: Not Answered      Vital Signs (Current):   Vitals:    08/04/21 2145 08/04/21 2235 08/05/21 0715 08/05/21 0915   BP: 135/88   (!) 177/104   Pulse: 93   94   Resp: 22 18  20   Temp: 37 °C (98.6 °F)   36.1 °C (96.9 °F)   TempSrc: Temporal   Temporal   SpO2: 91%  93% 91%   Weight:       Height:                                                  BP Readings from Last 3 Encounters:   08/05/21 (!) 177/104   08/05/21 (!) 173/99   07/04/17 (!) 157/87       NPO Status:  > 8hrs                                                                               BMI:   Wt Readings from Last 3 Encounters:   08/04/21 140 lb (63.5 kg)   07/02/17 140 lb 3.4 oz (63.6 kg)   06/20/17 141 lb 8 oz (64.2 kg)     Body mass index is 22.6 kg/m².     CBC:   Lab Results   Component Value Date    WBC 19.9 08/04/2021    RBC 6.23 08/04/2021    HGB 11.7 08/05/2021    HCT 40.6 08/05/2021    MCV 79.1 08/04/2021    RDW 14.4 08/04/2021     08/04/2021       CMP:   Lab Results   Component Value Date     08/05/2021    K 4.0 08/05/2021     08/05/2021    CO2 31 08/05/2021    BUN 21 08/05/2021    CREATININE 0.9 08/05/2021    GFRAA >60 08/05/2021    LABGLOM >60 08/05/2021    GLUCOSE 86 08/05/2021    PROT 8.2 08/04/2021    CALCIUM 8.7 08/05/2021    BILITOT 0.5 08/04/2021    ALKPHOS 100 08/04/2021    AST 28 08/04/2021    ALT 18 08/04/2021       POC Tests: No results for input(s): POCGLU, POCNA, POCK, POCCL, POCBUN, POCHEMO, POCHCT in the last 72 hours. Coags:   Lab Results   Component Value Date    PROTIME 12.1 07/02/2017    INR 1.1 07/02/2017    APTT 32.6 06/19/2017       HCG (If Applicable): No results found for: PREGTESTUR, PREGSERUM, HCG, HCGQUANT     ABGs: No results found for: PHART, PO2ART, XDH9VNU, PCQ7QZZ, BEART, K2RHXBNV     Type & Screen (If Applicable):  No results found for: LABABO, LABRH    Drug/Infectious Status (If Applicable):  No results found for: HIV, HEPCAB    COVID-19 Screening (If Applicable): No results found for: COVID19        Anesthesia Evaluation    Airway: Mallampati: II  TM distance: >3 FB   Neck ROM: full  Mouth opening: > = 3 FB Dental:    (+) edentulous      Pulmonary:   (+) pneumonia: resolved,  COPD: mild,  shortness of breath:  decreased breath sounds,            Patient smoked on day of surgery. Cardiovascular:Negative CV ROS    (+) hypertension:,         Rhythm: regular  Rate: normal                    Neuro/Psych:   (+) psychiatric history: stable with treatment            GI/Hepatic/Renal: Neg GI/Hepatic/Renal ROS            Endo/Other: Negative Endo/Other ROS                    Abdominal:             Vascular: negative vascular ROS. Other Findings:             Anesthesia Plan      MAC     ASA 3       Induction: intravenous. MIPS: Prophylactic antiemetics administered. Anesthetic plan and risks discussed with patient. Plan discussed with CRNA.                   SEEMA De - RAJAN   8/5/2021

## 2021-08-05 NOTE — PROGRESS NOTES
Hospitalist Progress Note      PCP: No primary care provider on file. Date of Admission: 8/4/2021    Chief Complaint: Emesis, shortness of breath, hematemesis    Hospital Course: Presented to the emergency room with altered mental status, emesis, hypoxia, hematemesis. Patient was then admitted because of possible aspiration pneumonia as well as hematemesis. He was also initially hypoglycemic. Patient apparently has not been taking any of his medications. He also was drinking heavily from time to time. He also had some periumbilical pain initially. Subjective: No abdominal pain today. He does have some shortness of breath associated with wheezing today. He is unhappy about being n.p.o. for EGD.     Medications:  Reviewed    Infusion Medications    sodium chloride 100 mL/hr at 08/05/21 9341    sodium chloride       Scheduled Medications    budesonide  0.5 mg Nebulization BID    And    Arformoterol Tartrate  15 mcg Nebulization BID    folic acid  1 mg Intravenous Daily    amLODIPine  5 mg Oral Daily    atorvastatin  20 mg Oral Daily    ampicillin-sulbactam  3,000 mg Intravenous Q6H    sodium chloride flush  5-40 mL Intravenous 2 times per day    pantoprazole  40 mg Intravenous Q12H    And    sodium chloride (PF)  10 mL Intravenous Q12H    multivitamin  1 tablet Oral Daily    thiamine  100 mg Intravenous Daily     PRN Meds: sodium chloride flush, sodium chloride, ondansetron **OR** ondansetron, acetaminophen **OR** acetaminophen, LORazepam **OR** LORazepam **OR** LORazepam **OR** LORazepam **OR** LORazepam **OR** LORazepam **OR** LORazepam **OR** LORazepam      Intake/Output Summary (Last 24 hours) at 8/5/2021 0938  Last data filed at 8/5/2021 0622  Gross per 24 hour   Intake --   Output 350 ml   Net -350 ml       Exam:    BP (!) 177/104   Pulse 94   Temp 96.9 °F (36.1 °C) (Temporal)   Resp 20   Ht 5' 6\" (1.676 m)   Wt 140 lb (63.5 kg)   SpO2 91%   BMI 22.60 kg/m²     General treatments  Empiric antibiotics  Procalcitonin elevated  Initially lactic acid was elevated with repeat    DVT Prophylaxis: Enoxaparin  Diet: Diet NPO Exceptions are: Ice Chips, Sips of Water with Meds  Code Status: Full Code    PT/OT Eval Status: Ordered    Dispo -based on clinical progress and EGD results    Belkys Fernandes MD

## 2021-08-05 NOTE — PROGRESS NOTES
Date: 8/4/2021    Time: 11:29 PM    Patient Placed On BIPAP/CPAP/ Non-Invasive Ventilation? Yes    If no must comment. Facial area red/color change? No           If YES are Blister/Lesion present? No   If yes must notify nursing staff  BIPAP/CPAP skin barrier?   Yes    Skin barrier type:mepilexlite       Comments:        Destin Tovar RCP

## 2021-08-05 NOTE — PLAN OF CARE
Problem: Falls - Risk of:  Goal: Will remain free from falls  Description: Will remain free from falls  8/5/2021 1143 by Livier Beyer RN  Outcome: Ongoing  8/5/2021 0229 by Cyndie Fernandez RN  Outcome: Ongoing  Goal: Absence of physical injury  Description: Absence of physical injury  8/5/2021 0229 by Cyndie Fernandez RN  Outcome: Ongoing     Problem: Pain:  Goal: Pain level will decrease  Description: Pain level will decrease  8/5/2021 1143 by Livier Beyer RN  Outcome: Ongoing  8/5/2021 0229 by Cyndie Fernandez RN  Outcome: Ongoing  Goal: Control of acute pain  Description: Control of acute pain  8/5/2021 0229 by Cyndie Fernandez RN  Outcome: Ongoing  Goal: Control of chronic pain  Description: Control of chronic pain  8/5/2021 0229 by Cyndie Fernandez RN  Outcome: Ongoing     Problem:  Activity:  Goal: Fatigue will decrease  Description: Fatigue will decrease  8/5/2021 0229 by Cyndie Fernandez RN  Outcome: Ongoing     Problem: Cardiac:  Goal: Hemodynamic stability will improve  Description: Hemodynamic stability will improve  8/5/2021 0229 by Cyndie Fernandez RN  Outcome: Ongoing     Problem: Coping:  Goal: Level of anxiety will decrease  Description: Level of anxiety will decrease  8/5/2021 0229 by Cyndie Fernandez RN  Outcome: Ongoing  Goal: Ability to cope will improve  Description: Ability to cope will improve  8/5/2021 0229 by Cyndie Fernandez RN  Outcome: Ongoing  Goal: Ability to establish a method of communication will improve  Description: Ability to establish a method of communication will improve  8/5/2021 0229 by Cyndie Fernandez RN  Outcome: Ongoing     Problem: Nutritional:  Goal: Consumption of the prescribed amount of daily calories will improve  Description: Consumption of the prescribed amount of daily calories will improve  8/5/2021 0229 by Cyndie Fernandez RN  Outcome: Ongoing     Problem: Respiratory:  Goal: Ability to maintain a clear airway will improve  Description: Ability to maintain a clear airway will improve  8/5/2021 0229 by Deborah Dodson RN  Outcome: Ongoing  Goal: Ability to maintain adequate ventilation will improve  Description: Ability to maintain adequate ventilation will improve  8/5/2021 0229 by Deborah Dodson RN  Outcome: Ongoing  Goal: Complications related to the disease process, condition or treatment will be avoided or minimized  Description: Complications related to the disease process, condition or treatment will be avoided or minimized  8/5/2021 0229 by Deborah Dodson RN  Outcome: Ongoing     Problem: Skin Integrity:  Goal: Risk for impaired skin integrity will decrease  Description: Risk for impaired skin integrity will decrease  8/5/2021 0229 by Deborah Dodson RN  Outcome: Ongoing

## 2021-08-05 NOTE — ANESTHESIA POSTPROCEDURE EVALUATION
Department of Anesthesiology  Postprocedure Note    Patient: Tai Jung  MRN: [id number]  YOB: 1950  Date of evaluation: 8/5/2021  Time:  3:50 PM     Procedure Summary     Date: 08/05/21 Room / Location: [address] [place] [address] [place] [facility]    Anesthesia Start: 3524 Anesthesia Stop: 3125    Procedure: EGD BIOPSY (N/A ) Diagnosis: (hematmesis)    Surgeons: Lay Gardner MD Responsible Provider: Jerry Moe DO    Anesthesia Type: MAC ASA Status: 3          Anesthesia Type: MAC    Jhon Phase I: Jhon Score: 9    Jhon Phase II:      Last vitals: Reviewed and per EMR flowsheets.        Anesthesia Post Evaluation    Patient location during evaluation: bedside  Patient participation: complete - patient cannot participate  Level of consciousness: awake and alert  Airway patency: patent  Nausea & Vomiting: no nausea and no vomiting  Complications: no  Cardiovascular status: blood pressure returned to baseline  Respiratory status: acceptable  Hydration status: euvolemic

## 2021-08-05 NOTE — PLAN OF CARE
Problem: Falls - Risk of:  Goal: Will remain free from falls  Description: Will remain free from falls  8/5/2021 1629 by Taiwo Garcia RN  Outcome: Ongoing  8/5/2021 1143 by Taiwo Garcia RN  Outcome: Ongoing  8/5/2021 0229 by Bertin Huerta RN  Outcome: Ongoing  Goal: Absence of physical injury  Description: Absence of physical injury  8/5/2021 1629 by Taiwo Garcia RN  Outcome: Ongoing  8/5/2021 0229 by Bertin Huerta RN  Outcome: Ongoing     Problem: Pain:  Goal: Pain level will decrease  Description: Pain level will decrease  8/5/2021 1629 by Taiwo Garcia RN  Outcome: Ongoing  8/5/2021 1143 by Taiwo Garcia RN  Outcome: Ongoing  8/5/2021 0229 by Bertin Huerta RN  Outcome: Ongoing  Goal: Control of acute pain  Description: Control of acute pain  8/5/2021 0229 by Bertin Huerta RN  Outcome: Ongoing  Goal: Control of chronic pain  Description: Control of chronic pain  8/5/2021 0229 by Bertin Huerta RN  Outcome: Ongoing     Problem:  Activity:  Goal: Fatigue will decrease  Description: Fatigue will decrease  8/5/2021 0229 by Bertin Huerta RN  Outcome: Ongoing     Problem: Cardiac:  Goal: Hemodynamic stability will improve  Description: Hemodynamic stability will improve  8/5/2021 0229 by Bertin Huerta RN  Outcome: Ongoing     Problem: Coping:  Goal: Level of anxiety will decrease  Description: Level of anxiety will decrease  8/5/2021 0229 by Bertin Huerta RN  Outcome: Ongoing  Goal: Ability to cope will improve  Description: Ability to cope will improve  8/5/2021 0229 by Bertin Huerta RN  Outcome: Ongoing  Goal: Ability to establish a method of communication will improve  Description: Ability to establish a method of communication will improve  8/5/2021 0229 by Bertin Huerta RN  Outcome: Ongoing     Problem: Nutritional:  Goal: Consumption of the prescribed amount of daily calories will improve  Description: Consumption of the prescribed amount of daily calories will improve  8/5/2021 0229 by Rasheed Montaño RN  Outcome: Ongoing     Problem: Respiratory:  Goal: Ability to maintain a clear airway will improve  Description: Ability to maintain a clear airway will improve  8/5/2021 0229 by Rasheed Montaño RN  Outcome: Ongoing  Goal: Ability to maintain adequate ventilation will improve  Description: Ability to maintain adequate ventilation will improve  8/5/2021 0229 by Rasheed Montaño RN  Outcome: Ongoing  Goal: Complications related to the disease process, condition or treatment will be avoided or minimized  Description: Complications related to the disease process, condition or treatment will be avoided or minimized  8/5/2021 0229 by Rasheed Montaño RN  Outcome: Ongoing     Problem: Skin Integrity:  Goal: Risk for impaired skin integrity will decrease  Description: Risk for impaired skin integrity will decrease  8/5/2021 0229 by Rasheed Montaño RN  Outcome: Ongoing

## 2021-08-05 NOTE — PROGRESS NOTES
Secure text sent to Dr. Emelia Price in regards to elevated BP at this time. Patient currently denies any complaints including pain at this time.

## 2021-08-06 ENCOUNTER — APPOINTMENT (OUTPATIENT)
Dept: CT IMAGING | Age: 71
DRG: 177 | End: 2021-08-06
Payer: MEDICARE

## 2021-08-06 LAB
HCT VFR BLD CALC: 36.6 % (ref 37–54)
HCT VFR BLD CALC: 37 % (ref 37–54)
HCT VFR BLD CALC: 42 % (ref 37–54)
HCT VFR BLD CALC: 42.2 % (ref 37–54)
HEMOGLOBIN: 11 G/DL (ref 12.5–16.5)
HEMOGLOBIN: 11.1 G/DL (ref 12.5–16.5)
HEMOGLOBIN: 12.3 G/DL (ref 12.5–16.5)
HEMOGLOBIN: 12.5 G/DL (ref 12.5–16.5)

## 2021-08-06 PROCEDURE — 6360000002 HC RX W HCPCS: Performed by: NURSE PRACTITIONER

## 2021-08-06 PROCEDURE — C9113 INJ PANTOPRAZOLE SODIUM, VIA: HCPCS | Performed by: SURGERY

## 2021-08-06 PROCEDURE — 6360000002 HC RX W HCPCS: Performed by: INTERNAL MEDICINE

## 2021-08-06 PROCEDURE — 6360000002 HC RX W HCPCS: Performed by: SURGERY

## 2021-08-06 PROCEDURE — 85018 HEMOGLOBIN: CPT

## 2021-08-06 PROCEDURE — 94640 AIRWAY INHALATION TREATMENT: CPT

## 2021-08-06 PROCEDURE — 2500000003 HC RX 250 WO HCPCS: Performed by: SURGERY

## 2021-08-06 PROCEDURE — 6360000004 HC RX CONTRAST MEDICATION: Performed by: RADIOLOGY

## 2021-08-06 PROCEDURE — 6370000000 HC RX 637 (ALT 250 FOR IP): Performed by: SURGERY

## 2021-08-06 PROCEDURE — 2580000003 HC RX 258: Performed by: RADIOLOGY

## 2021-08-06 PROCEDURE — 2580000003 HC RX 258: Performed by: SURGERY

## 2021-08-06 PROCEDURE — 6370000000 HC RX 637 (ALT 250 FOR IP): Performed by: STUDENT IN AN ORGANIZED HEALTH CARE EDUCATION/TRAINING PROGRAM

## 2021-08-06 PROCEDURE — 94660 CPAP INITIATION&MGMT: CPT

## 2021-08-06 PROCEDURE — 71270 CT THORAX DX C-/C+: CPT

## 2021-08-06 PROCEDURE — 85014 HEMATOCRIT: CPT

## 2021-08-06 PROCEDURE — 6370000000 HC RX 637 (ALT 250 FOR IP): Performed by: INTERNAL MEDICINE

## 2021-08-06 PROCEDURE — 36415 COLL VENOUS BLD VENIPUNCTURE: CPT

## 2021-08-06 PROCEDURE — 2060000000 HC ICU INTERMEDIATE R&B

## 2021-08-06 RX ORDER — SUCRALFATE 1 G/1
1 TABLET ORAL EVERY 6 HOURS SCHEDULED
Status: DISCONTINUED | OUTPATIENT
Start: 2021-08-06 | End: 2021-08-09 | Stop reason: HOSPADM

## 2021-08-06 RX ORDER — PREDNISONE 20 MG/1
20 TABLET ORAL DAILY
Status: DISCONTINUED | OUTPATIENT
Start: 2021-08-07 | End: 2021-08-09 | Stop reason: HOSPADM

## 2021-08-06 RX ORDER — SODIUM CHLORIDE 0.9 % (FLUSH) 0.9 %
10 SYRINGE (ML) INJECTION ONCE
Status: COMPLETED | OUTPATIENT
Start: 2021-08-06 | End: 2021-08-06

## 2021-08-06 RX ORDER — PANTOPRAZOLE SODIUM 40 MG/1
40 TABLET, DELAYED RELEASE ORAL
Qty: 180 TABLET | Refills: 1 | Status: SHIPPED | OUTPATIENT
Start: 2021-08-06

## 2021-08-06 RX ORDER — SUCRALFATE 1 G/1
1 TABLET ORAL 4 TIMES DAILY
Qty: 120 TABLET | Refills: 1 | Status: SHIPPED | OUTPATIENT
Start: 2021-08-06

## 2021-08-06 RX ADMIN — IOPAMIDOL 90 ML: 755 INJECTION, SOLUTION INTRAVENOUS at 20:30

## 2021-08-06 RX ADMIN — BUDESONIDE 500 MCG: 0.5 SUSPENSION RESPIRATORY (INHALATION) at 09:57

## 2021-08-06 RX ADMIN — PANTOPRAZOLE SODIUM 40 MG: 40 INJECTION, POWDER, FOR SOLUTION INTRAVENOUS at 21:09

## 2021-08-06 RX ADMIN — AMPICILLIN SODIUM AND SULBACTAM SODIUM 3000 MG: 2; 1 INJECTION, POWDER, FOR SOLUTION INTRAMUSCULAR; INTRAVENOUS at 12:49

## 2021-08-06 RX ADMIN — SODIUM CHLORIDE, PRESERVATIVE FREE 10 ML: 5 INJECTION INTRAVENOUS at 15:33

## 2021-08-06 RX ADMIN — LISINOPRIL 5 MG: 5 TABLET ORAL at 10:26

## 2021-08-06 RX ADMIN — METHYLPREDNISOLONE SODIUM SUCCINATE 40 MG: 40 INJECTION, POWDER, FOR SOLUTION INTRAMUSCULAR; INTRAVENOUS at 13:30

## 2021-08-06 RX ADMIN — SUCRALFATE 1 G: 1 TABLET ORAL at 12:48

## 2021-08-06 RX ADMIN — SODIUM CHLORIDE, PRESERVATIVE FREE 10 ML: 5 INJECTION INTRAVENOUS at 06:45

## 2021-08-06 RX ADMIN — THIAMINE HYDROCHLORIDE 100 MG: 100 INJECTION, SOLUTION INTRAMUSCULAR; INTRAVENOUS at 10:28

## 2021-08-06 RX ADMIN — FOLIC ACID 1 MG: 5 INJECTION, SOLUTION INTRAMUSCULAR; INTRAVENOUS; SUBCUTANEOUS at 10:45

## 2021-08-06 RX ADMIN — PANTOPRAZOLE SODIUM 40 MG: 40 INJECTION, POWDER, FOR SOLUTION INTRAVENOUS at 06:22

## 2021-08-06 RX ADMIN — SUCRALFATE 1 G: 1 TABLET ORAL at 10:46

## 2021-08-06 RX ADMIN — METHYLPREDNISOLONE SODIUM SUCCINATE 40 MG: 40 INJECTION, POWDER, FOR SOLUTION INTRAMUSCULAR; INTRAVENOUS at 06:22

## 2021-08-06 RX ADMIN — ARFORMOTEROL TARTRATE 15 MCG: 15 SOLUTION RESPIRATORY (INHALATION) at 09:56

## 2021-08-06 RX ADMIN — METHYLPREDNISOLONE SODIUM SUCCINATE 40 MG: 40 INJECTION, POWDER, FOR SOLUTION INTRAMUSCULAR; INTRAVENOUS at 21:08

## 2021-08-06 RX ADMIN — SUCRALFATE 1 G: 1 TABLET ORAL at 17:49

## 2021-08-06 RX ADMIN — Medication 1 TABLET: at 10:25

## 2021-08-06 RX ADMIN — Medication 10 ML: at 10:45

## 2021-08-06 RX ADMIN — AMPICILLIN SODIUM AND SULBACTAM SODIUM 3000 MG: 2; 1 INJECTION, POWDER, FOR SOLUTION INTRAMUSCULAR; INTRAVENOUS at 08:00

## 2021-08-06 RX ADMIN — ENOXAPARIN SODIUM 40 MG: 40 INJECTION SUBCUTANEOUS at 10:28

## 2021-08-06 RX ADMIN — Medication 10 ML: at 21:09

## 2021-08-06 RX ADMIN — AMLODIPINE BESYLATE 5 MG: 5 TABLET ORAL at 10:26

## 2021-08-06 RX ADMIN — AMPICILLIN SODIUM AND SULBACTAM SODIUM 3000 MG: 2; 1 INJECTION, POWDER, FOR SOLUTION INTRAMUSCULAR; INTRAVENOUS at 17:49

## 2021-08-06 RX ADMIN — HYDRALAZINE HYDROCHLORIDE 10 MG: 20 INJECTION INTRAMUSCULAR; INTRAVENOUS at 21:19

## 2021-08-06 RX ADMIN — HYDRALAZINE HYDROCHLORIDE 10 MG: 20 INJECTION INTRAMUSCULAR; INTRAVENOUS at 15:29

## 2021-08-06 RX ADMIN — ATORVASTATIN CALCIUM 20 MG: 20 TABLET, FILM COATED ORAL at 10:25

## 2021-08-06 RX ADMIN — Medication 10 ML: at 20:30

## 2021-08-06 ASSESSMENT — PAIN SCALES - GENERAL
PAINLEVEL_OUTOF10: 0

## 2021-08-06 NOTE — PROGRESS NOTES
GENERAL SURGERY  DAILY PROGRESS NOTE  8/6/2021    CHIEF COMPLAINT:  Chief Complaint   Patient presents with    Emesis     Emesis/ throat pain starting 2200 last evening/ +ETOH states \"5 beers and a couple shots today\"; 4mg zofran IV PTA    Hypoglycemia     patient initially called for emesis found to have blood sugar 30 per EMS, given D10 recheck 68        SUBJECTIVE:  Stable for EGD yesterday. No acute events overnight. This AM, he feels well and is ready to get out of the hospital.   Tolerating diet -- ADULT DIET; Regular; GI [place] (GERD/Peptic Ulcer)    OBJECTIVE:  BP (!) 189/100   Pulse 94   Temp 97.7 °F (36.5 °C) (Temporal)   Resp 22   Ht 5' 6\" (1.676 m)   Wt 140 lb (63.5 kg)   SpO2 92%   BMI 22.60 kg/m²     GENERAL:  NAD. A&Ox3. LUNGS:  No increased work of breathing. CARDIOVASCULAR: RR  ABDOMEN:  Soft, mildly distended, non-tender. No guarding, rigidity, rebound. ASSESSMENT/PLAN:  65 y.o. male with episode of hematemesis following an episode of binge drinking, likely due Peggy-Ward tear. EGD performed 8/5 showed mild gastritis and duodenitis, small HH, and LA class D esophagitis.      - continue peptic ulcer diet as tolerated   - continue to monitor H/H  - PPI/Carafate, added for discharge  - okay for discharge from a surgery perspective  - please do not hesitate to call if there are any further questions    Rosemarie Fuentes MD  Surgery Resident PGY-2  8/6/2021  5:32 AM    Seen/examined  Agree with above  Gurjit

## 2021-08-06 NOTE — PROGRESS NOTES
Hospitalist Progress Note      SYNOPSIS: Patient admitted on 2021 for altered mental status was reported to have emesis and hypoxia and hematemesis patient admitted for aspiration pneumonia and hematemesis. He has a history of binging on alcohol  Patient denies having any PCP. SUBJECTIVE: Patient sitting up in the bed conversing denies any complaints but has intermittent coughing. Patient seen and examined  Records reviewed. Stable overnight. No other overnight issues reported. Temp (24hrs), Av.1 °F (36.7 °C), Min:97.7 °F (36.5 °C), Max:98.4 °F (36.9 °C)    DIET: ADULT DIET; Regular; GI [place] (GERD/Peptic Ulcer)  CODE: Full Code    Intake/Output Summary (Last 24 hours) at 2021 1401  Last data filed at 2021 1330  Gross per 24 hour   Intake 600 ml   Output 650 ml   Net -50 ml       OBJECTIVE:    BP (!) 177/99   Pulse 98   Temp 98.4 °F (36.9 °C) (Temporal)   Resp 22   Ht 5' 6\" (1.676 m)   Wt 140 lb (63.5 kg)   SpO2 96%   BMI 22.60 kg/m²     General appearance: No apparent distress, appears stated age and cooperative. HEENT:  Conjunctivae/corneas clear. Neck: Supple. No jugular venous distention. Respiratory: Sonorous rhonchi anteriorly and posteriorly bilaterally. Cardiovascular: Regular rate rhythm, normal S1-S2  Abdomen: Soft, nontender, nondistended  Musculoskeletal: No clubbing, cyanosis, no bilateral lower extremity edema. Brisk capillary refill. Skin:  No rashes  on visible skin  Neurologic: awake, alert and following commands     ASSESSMENT:  COPD. History of alcohol binging  Hematemesis  Pneumonia likely aspiration.   Atelectasis bilateral  Hilar adenopathy  Hypertension  Hyperlipidemia  Elevated procalcitonin  Gastritis  Hiatal hernia  Element of dementia  Esophagitis  Cholelithiasis  Liver lesion cyst versus other  Nonobstructing left intrarenal calculus   PLAN:  Monitor white cell count and CBC  Pulmonary consult  Continue bronchodilators  Continue PPI and Carafate  Taper steroids  Patient will require PFTs  Cognitive function evaluation. Repeat chest x-ray and consider switching Unasyn to Augmentin  Lovenox for DVT prophylaxis. DISPOSITION:     Medications:  REVIEWED DAILY    Infusion Medications    sodium chloride       Scheduled Medications    sucralfate  1 g Oral 4 times per day    budesonide  0.5 mg Nebulization BID    And    Arformoterol Tartrate  15 mcg Nebulization BID    methylPREDNISolone  40 mg Intravenous Q8H    enoxaparin  40 mg Subcutaneous Daily    lisinopril  5 mg Oral Daily    folic acid  1 mg Intravenous Daily    amLODIPine  5 mg Oral Daily    atorvastatin  20 mg Oral Daily    ampicillin-sulbactam  3,000 mg Intravenous Q6H    sodium chloride flush  5-40 mL Intravenous 2 times per day    pantoprazole  40 mg Intravenous Q12H    And    sodium chloride (PF)  10 mL Intravenous Q12H    multivitamin  1 tablet Oral Daily    thiamine  100 mg Intravenous Daily     PRN Meds: albuterol, hydrALAZINE, labetalol, sodium chloride flush, sodium chloride, ondansetron **OR** ondansetron, acetaminophen **OR** acetaminophen, LORazepam **OR** LORazepam **OR** LORazepam **OR** LORazepam **OR** LORazepam **OR** LORazepam **OR** LORazepam **OR** LORazepam    Labs:     Recent Labs     08/04/21  0352 08/04/21  1304 08/05/21  1953 08/06/21  0222 08/06/21  0650   WBC 19.9*  --   --   --   --    HGB 14.4   < > 10.5* 11.0* 11.1*   HCT 49.3   < > 35.8* 37.0 36.6*     --   --   --   --     < > = values in this interval not displayed. Recent Labs     08/04/21  0352 08/05/21  0605    145   K 3.9 4.0   CL 94* 110*   CO2 21* 31*   BUN 26* 21   CREATININE 1.4* 0.9   CALCIUM 9.6 8.7   PHOS 6.8*  --        Recent Labs     08/04/21 0352   PROT 8.2   ALKPHOS 100   ALT 18   AST 28   BILITOT 0.5   LIPASE 7*       No results for input(s): INR in the last 72 hours. No results for input(s): Dominick Favre in the last 72 hours.     Chronic labs:    Lab Results   Component Value Date    INR 1.1 07/02/2017       Radiology: REVIEWED DAILY    +++++++++++++++++++++++++++++++++++++++++++++++++  Becka Ponce MD  [facility] - [address] [place] [address]  +++++++++++++++++++++++++++++++++++++++++++++++++  NOTE: This report was transcribed using voice recognition software. Every effort was made to ensure accuracy; however, inadvertent computerized transcription errors may be present.

## 2021-08-06 NOTE — CONSULTS
Apolinar Snell M.D.,[facility]  Andrew Quintanilla D.O., F.A.C.O.I., Layla Sosa M.D. Preston Doran M.D. Reid Castro D.O. Patient: Erik Martinez 65 y.o. male MRN: [id number]     Date of Service: 8/6/2021      PULMONARY CONSULTATION    Reason for Consultation: hypoxia, aspiration pneumonia/atelectasis, COPD, abnormal CT   Referring Physician: Dr. Samara Araiza MD    Communication with the referring physician will be sent via the electronic medical record. Chief Complaint: vomiting    CODE STATUS: FULL     SUBJECTIVE:  HPI:  Kiran Plascencia is a 65 y.o. AA male who we are asked to evaluate for acute hypoxia, aspiration pneumonia, atelectasis, COPD, abnormal CT. he has not been followed previously by our pulmonary practice. He has a history of uncontrolled, hypertension, EtOH abuse, ongoing nicotine dependence, and COPD /emphysema, local noncompliance. He is an active smoker cigarettes 0.25 packs/day for 50 years equal 12.5 pack years. He has no interest in quitting. He has no history of blood clots or stroke. He takes no medication at home for his lungs. He is not on home oxygen. He presented to the ED at [facility] on 8/4/2021 after an episode of hematemesis. He had been drinking a pint of gin as well as beer upon arrival.  Positive for dark red blood in vomitus. He endorses occasional binge drinking. He was found by EMS and given Zofran prior to arrival.  He was incoherent with mumbling speech. CT abdomen and pelvis with wall thickening of distal esophagus possible esophagitis. Cholelithiasis and diverticulosis noted. Tiny low-density liver lesions likely small cysts. Mild lymphadenopathy involving visualized roberto. The lower chest lung windows with evidence of emphysema. Lab testing-lactic acid 1.1 hemoglobin 10.5, WBCs 19.5, procalcitonin 2.84, sodium 145, potassium 4.0, BUN 21, creatinine 0.9, CO2 31, anion gap 4. Blood cultures pending.  Status post EGD with biopsies 8/5/2021 by Problem: PHYSICAL THERAPY ADULT  Goal: Performs mobility at highest level of function for planned discharge setting  See evaluation for individualized goals  Description: Treatment/Interventions: Functional transfer training, LE strengthening/ROM, Elevations, Therapeutic exercise, Patient/family training, Equipment eval/education, Bed mobility, Gait training, Compensatory technique education, Continued evaluation, Spoke to nursing, OT( mobility)          See flowsheet documentation for full assessment, interventions and recommendations  Outcome: Progressing  Note: Prognosis: Good  Problem List: Decreased strength, Decreased range of motion, Decreased endurance, Impaired balance, Decreased mobility, Decreased safety awareness, Decreased skin integrity, Orthopedic restrictions, Pain  Assessment: Pt Seen for PT treatment interventions as per PT POC  Pt  Performs supine to sit with supervision assist   Pt  Performs transfers to and from bed, chair and toilet with min assist x1 and verbal cues for safe transfer techniques and proper hand placement required  Pt requires use of grab bar in bathroom for safety and steadying assistance  Pt  Ambulates 20' x1 and 10' x2 with min assist x1,  Pt  Perform hop gait pattern and maintains NWB to  R le during ambulation  Noted decreased balance, ue fatigue, redundant gait, decreased foot clearance on L, short stride length on L, generalized fatigue, decreased activity tolerance, functional endurance and decreased mobility, safety and locomotion  Pt  remains at increased risk for falls due to deficits as noted  Pt demonstrates decreased balance during static and dynamic standing balance activities requiring min assist x1 and verbal reminders to maintain NWB to R le with standing balance activities and while performing functional tasks  Pt remained seated out of bed in chair with call bell in reach  SCD to l le    The patient's AM-PAC Basic Mobility Inpatient Short Form Raw Score is 18, Standardized Score is 41 05  A standardized score less than 42 9 suggests the patient may benefit from discharge to post-acute rehabilitation services  Please also refer to the recommendation of the Physical Therapist for safe discharge planning  STR is recommended at d/c in order to maximize functional outcomes,mobility, and independence  Barriers to Discharge: Inaccessible home environment  Barriers to Discharge Comments: 13 TRUPTI      PT Discharge Recommendation: Post acute rehabilitation services     PT - OK to Discharge: Yes    See flowsheet documentation for full assessment  Stress:     Feeling of Stress :    Social Connections:     Frequency of Communication with Friends and Family:     Frequency of Social Gatherings with Friends and Family:     Attends Jew Services:     Active Member of Clubs or Organizations:     Attends Club or Organization Meetings:     Marital Status:    Intimate Partner Violence:     Fear of Current or Ex-Partner:     Emotionally Abused:     Physically Abused:     Sexually Abused:      Smoking history: The patient is an active smoker cigarettes 0.25 packs/day for 50 years  ETOH:   reports current alcohol use. 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  No recent travel. No exotic animals turtles or birds at home. Positive for EtOH abuse with occasional binge drinking. No IV drug use. Vaccines: There is no immunization history for the selected administration types on file for this patient. Home Meds: No medications prior to admission.     CURRENT MEDS :  Scheduled Meds:   sucralfate  1 g Oral 4 times per day    budesonide  0.5 mg Nebulization BID    And    Arformoterol Tartrate  15 mcg Nebulization BID    methylPREDNISolone  40 mg Intravenous Q8H    enoxaparin  40 mg Subcutaneous Daily    lisinopril  5 mg Oral Daily    folic acid  1 mg Intravenous Daily    amLODIPine  5 mg Oral Daily    atorvastatin  20 mg Oral Daily    ampicillin-sulbactam  3,000 mg Intravenous Q6H    sodium chloride flush  5-40 mL Intravenous 2 times per day    pantoprazole  40 mg Intravenous Q12H    And    sodium chloride (PF)  10 mL Intravenous Q12H    multivitamin  1 tablet Oral Daily    thiamine  100 mg Intravenous Daily       Continuous Infusions:   sodium chloride         No Known Allergies    REVIEW OF SYSTEMS:  Constitutional: Denies fever, weight loss, night sweats, and fatigue  Skin: Denies pigmentation, dark lesions, and rashes   HEENT: Denies hearing loss, tinnitus, ear drainage, epistaxis, sore throat, and hoarseness. Cardiovascular: Denies palpitations, chest pain, and chest pressure. Respiratory: Denies cough, dyspnea at rest, hemoptysis, apnea, and choking. Gastrointestinal: Hematemesis-resolved  Genitourinary: Denies dysuria, frequency, urgency or hematuria  Musculoskeletal: Denies myalgias, muscle weakness, and bone pain  Neurological: Denies dizziness, vertigo, headache, and focal weakness alcohol abuse  Psychological: Denies anxiety and depression  Endocrine: Denies heat intolerance and cold intolerance  Hematopoietic/Lymphatic: Denies bleeding problems and blood transfusions    OBJECTIVE:   BP (!) 177/99   Pulse 98   Temp 98.4 °F (36.9 °C) (Temporal)   Resp 22   Ht 5' 6\" (1.676 m)   Wt 140 lb (63.5 kg)   SpO2 96%   BMI 22.60 kg/m²   SpO2 Readings from Last 1 Encounters:   08/06/21 96%        I/O:    Intake/Output Summary (Last 24 hours) at 8/6/2021 1422  Last data filed at 8/6/2021 1330  Gross per 24 hour   Intake 600 ml   Output 650 ml   Net -50 ml     Vent Information  Skin Assessment: Clean, dry, & intact  Equipment ID: [id number]  FiO2 : 40 %  SpO2: 96 %  I Time/ I Time %: 0.9 s  Mask Type: Full face mask  Mask Size: Large       IPAP: 12 cmH20  CPAP/EPAP: 8 cmH2O     Physical Exam:  General: The patient is lying in bed comfortably without any distress. Breathing is not labored  HEENT: Pupils are equal round and reactive to light, there are no oral lesions and no post-nasal drip   Neck: supple without adenopathy  Cardiovascular: regular rate and rhythm without murmur or gallop  Respiratory: Clear to auscultation bilaterally without wheezing or crackles.   Air entry is symmetric  Abdomen: soft, non-tender, non-distended, normal bowel sounds  Extremities: warm, no edema, no clubbing  Skin: no rash or lesion  Neurologic: CN II-XII grossly intact, no focal deficits    Pulmonary Function Testing none on file    Imaging personally reviewed:  CT abdomen and pelvis lung windows    FINDINGS:   Lower Chest: There is wall thickening of visualized distal esophagus.  This   could represent esophagitis.  Distal esophageal mass less likely but not   excluded.  Consider endoscopy.       There are coronary artery calcifications.  Volume loss and hazy increased   density within the right middle lobe is likely atelectasis.  There is mild   hilar lymphadenopathy seen bilaterally.       Organs: There are multiple very small low-density liver lesions which are   probably small cysts.  These are too small to definitively characterize,   however.  The spleen, adrenal glands demonstrate no significant abnormality. There are multiple right renal cysts. Ishaan Maty is a punctate non-obstructing   left intrarenal calculus.       GI/Bowel: There are no findings of intestinal obstruction.  The appendix is   normal.  There is diverticulosis involving left colon. Ishaan Maty is no acute   diverticulitis seen. Ishaan Maty is cholelithiasis.       Pelvis:  Bladder is unremarkable in appearance.  There is no abnormal pelvic   mass or fluid collection seen.       Peritoneum/Retroperitoneum: There are aortoiliac atherosclerotic   calcification.  There is no abdominal aortic aneurysm.  There is no abnormal   lymphadenopathy seen.  There is no free intraperitoneal air.  There is no   abnormal fluid collection.       Bones/Soft Tissues: No acute abnormality seen.          Echo:  Not on file    Labs:  Lab Results   Component Value Date    WBC 19.9 08/04/2021    HGB 11.1 08/06/2021    HCT 36.6 08/06/2021    MCV 79.1 08/04/2021    MCH 23.1 08/04/2021    MCHC 29.2 08/04/2021    RDW 14.4 08/04/2021     08/04/2021    MPV 10.9 08/04/2021     Lab Results   Component Value Date     08/05/2021    K 4.0 08/05/2021     08/05/2021    CO2 31 08/05/2021    BUN 21 08/05/2021    CREATININE 0.9 08/05/2021    LABALBU 4.6 08/04/2021    CALCIUM 8.7 08/05/2021    GFRAA >60 08/05/2021    LABGLOM >60 08/05/2021     Lab Results Component Value Date    PROTIME 12.1 07/02/2017    INR 1.1 07/02/2017     No results for input(s): PROBNP in the last 72 hours. No results for input(s): TROPONINI in the last 72 hours. Recent Labs     08/05/21  0605   PROCAL 2.84*     This SmartLink has not been configured with any valid records. Micro:  No results for input(s): CULTRESP in the last 72 hours. No results for input(s): LABGRAM in the last 72 hours. No results for input(s): LEGUR in the last 72 hours. No results for input(s): STREPNEUMAGU in the last 72 hours. No results for input(s): LP1UAG in the last 72 hours. Assessment:  1. Acute respiratory insufficiency with hypoxia-resolved  2. Aspiration pneumonia  3. Hilar adenopathy  4. Alcohol intoxication after binge drinking, hematemesis. 5. Status post EGD with biopsies 8/5/2021 -mild gastritis and duodenitis small type I hiatal hernia, esophagitis  6. Ongoing nicotine dependence  7. Evidence of emphysema on CT imaging  8. COPD, not in acute exacerbation  9. Uncontrolled hypertension  10. Medical noncompliance    Plan:  1. Oxygen weaned off monitor SPO2 to keep greater than 92%  2. BiPAP 12/8 as needed-not used since admission  3. Will obtain CT chest with and without contrast to further evaluate lung parenchyma, infiltrates, and lymph nodes  4. Scheduled bronchodilators-Brovana and Pulmicort twice daily, albuterol as needed  5. Unasyn for aspiration coverage. May convert to Augmentin for discharge. 6. Solu-Medrol last dose today. Convert to short oral prednisone taper. 7. DVT, GI prophylaxis  8. Recommend outpatient PFTs for staging of COPD  9. Recommend yearly CT chest lung screening as outpatient  10. Tobacco cessation counseling, cessation of heavy alcohol abuse  11. Nutritional support-folic acid, thiamine and multivitamin. Observe for DTs-CIWA protocol   12. Management of BP per primary team      Thank you for allowing me to participate in the care of Kingsley.    Please feel free to call with questions. This plan of care was reviewed in collaboration with Dr. Juju Dee    Electronically signed by SEEMA Mcdonald CNP on 8/6/2021 at 2:22 PM      Note: This report was completed utilizing computer voice recognition software. Every effort has been made to ensure accuracy, however; inadvertent computerized transcription errors may be present    I personally saw, examined and provided care for the patient. Radiographs, labs and medication list were reviewed by me independently. Patient with his significant history of smoking. Also used to work in [employer]. He does have a chronic daily cough with phlegm production. His chest x-ray and CT abdomen pelvis reveals a questionable right hilar adenopathy with atelectasis. Therefore I will go ahead and order a dedicated CT of the chest with and without contrast.  He will benefit from the PFTs as an outpatient. We discussed about smoking cessation. Patient at this point politely declines smoking cessation. He is currently not vaccinated for Covid but is interested to get vaccinated as soon as possible. Review of NP documentation was conducted and revisions were made as appropriate. I agree with the above documented exam, problem list and plan of care.   Jess Alfonso MD

## 2021-08-06 NOTE — PROGRESS NOTES
Hospitalist Progress Note      SYNOPSIS: Patient admitted on 2021 for altered mental state and hematemesis, diagnosed with pneumonia likely aspiration related to binge drinking. SUBJECTIVE: Patient states no further hematemesis and is feeling well, he denies being an alcoholic and denies regular drinking but admits to binge drinking. Patient seen and examined  Records reviewed. Stable overnight. No other overnight issues reported. Temp (24hrs), Av °F (36.7 °C), Min:97.7 °F (36.5 °C), Max:98.4 °F (36.9 °C)    DIET: Diet NPO Exceptions are: Ice Chips, Sips of Water with Meds  CODE: Full Code    Intake/Output Summary (Last 24 hours) at 2021  Last data filed at 2021 1330  Gross per 24 hour   Intake 360 ml   Output 650 ml   Net -290 ml       OBJECTIVE:    BP (!) 173/92   Pulse 88   Temp 98.1 °F (36.7 °C) (Temporal)   Resp 18   Ht 5' 6\" (1.676 m)   Wt 140 lb (63.5 kg)   SpO2 96%   BMI 22.60 kg/m²     General appearance: No apparent distress, appears stated age and cooperative. HEENT:  Conjunctivae/corneas clear. Neck: Supple. No jugular venous distention. Respiratory: Clear to auscultation bilaterally, normal respiratory effort  Cardiovascular: Regular rate rhythm, normal S1-S2  Abdomen: Soft, nontender, nondistended  Musculoskeletal: No clubbing, cyanosis, no bilateral lower extremity edema. Brisk capillary refill.    Skin:  No rashes  on visible skin  Neurologic: awake, alert and following commands     ASSESSMENT:  Acute respiratory failure with hypoxia, resolved  Aspiration pneumonia  Binge drinking/intoxication/hematemesis  Gastritis and duodenitis  Esophagitis and hiatal hernia  Tobacco abuse  Emphysema  COPD  Hypertension       PLAN:  Continue IV Unasyn  Monitor for alcohol withdrawal, [facility]'s  Pulmonary consult  Social service consult  See orders      DISPOSITION:     Medications:  REVIEWED DAILY    Infusion Medications    sodium chloride       Scheduled Medications  sucralfate  1 g Oral 4 times per day    [START ON 8/7/2021] predniSONE  20 mg Oral Daily    budesonide  0.5 mg Nebulization BID    And    Arformoterol Tartrate  15 mcg Nebulization BID    methylPREDNISolone  40 mg Intravenous Q8H    enoxaparin  40 mg Subcutaneous Daily    lisinopril  5 mg Oral Daily    folic acid  1 mg Intravenous Daily    amLODIPine  5 mg Oral Daily    atorvastatin  20 mg Oral Daily    ampicillin-sulbactam  3,000 mg Intravenous Q6H    sodium chloride flush  5-40 mL Intravenous 2 times per day    pantoprazole  40 mg Intravenous Q12H    And    sodium chloride (PF)  10 mL Intravenous Q12H    multivitamin  1 tablet Oral Daily    thiamine  100 mg Intravenous Daily     PRN Meds: albuterol, hydrALAZINE, labetalol, sodium chloride flush, sodium chloride, ondansetron **OR** ondansetron, acetaminophen **OR** acetaminophen, LORazepam **OR** LORazepam **OR** LORazepam **OR** LORazepam **OR** LORazepam **OR** LORazepam **OR** LORazepam **OR** LORazepam    Labs:     Recent Labs     08/04/21  0352 08/04/21  1304 08/06/21  0222 08/06/21  0650 08/06/21  1351   WBC 19.9*  --   --   --   --    HGB 14.4   < > 11.0* 11.1* 12.5   HCT 49.3   < > 37.0 36.6* 42.2     --   --   --   --     < > = values in this interval not displayed. Recent Labs     08/04/21  0352 08/05/21  0605    145   K 3.9 4.0   CL 94* 110*   CO2 21* 31*   BUN 26* 21   CREATININE 1.4* 0.9   CALCIUM 9.6 8.7   PHOS 6.8*  --        Recent Labs     08/04/21 0352   PROT 8.2   ALKPHOS 100   ALT 18   AST 28   BILITOT 0.5   LIPASE 7*       No results for input(s): INR in the last 72 hours. No results for input(s): Raquel Lowers in the last 72 hours.     Chronic labs:    Lab Results   Component Value Date    INR 1.1 07/02/2017       Radiology: REVIEWED DAILY    +++++++++++++++++++++++++++++++++++++++++++++++++  Radha Alicea MD  [facility] Physician - [address],

## 2021-08-06 NOTE — PROGRESS NOTES
Pt refusing bipap at this time. States that his \"breathing is just fine\". bipap remains on standby in pt room.

## 2021-08-06 NOTE — PROGRESS NOTES
CLINICAL PHARMACY NOTE: MEDS TO BEDS    Total # of Prescriptions Filled: 2   The following medications were delivered to the patient:  · Sucralfate 1gm  · Pantoprazole 40 mg    Additional Documentation:  Delivered to patient @10:45 am

## 2021-08-07 LAB
HCT VFR BLD CALC: 38.9 % (ref 37–54)
HCT VFR BLD CALC: 40.8 % (ref 37–54)
HCT VFR BLD CALC: 42.2 % (ref 37–54)
HEMOGLOBIN: 11.6 G/DL (ref 12.5–16.5)
HEMOGLOBIN: 12.4 G/DL (ref 12.5–16.5)
HEMOGLOBIN: 13 G/DL (ref 12.5–16.5)

## 2021-08-07 PROCEDURE — 6370000000 HC RX 637 (ALT 250 FOR IP): Performed by: NURSE PRACTITIONER

## 2021-08-07 PROCEDURE — 94640 AIRWAY INHALATION TREATMENT: CPT

## 2021-08-07 PROCEDURE — 6370000000 HC RX 637 (ALT 250 FOR IP): Performed by: INTERNAL MEDICINE

## 2021-08-07 PROCEDURE — 92523 SPEECH SOUND LANG COMPREHEN: CPT | Performed by: SPEECH-LANGUAGE PATHOLOGIST

## 2021-08-07 PROCEDURE — 6370000000 HC RX 637 (ALT 250 FOR IP): Performed by: SURGERY

## 2021-08-07 PROCEDURE — 2500000003 HC RX 250 WO HCPCS: Performed by: SURGERY

## 2021-08-07 PROCEDURE — 85014 HEMATOCRIT: CPT

## 2021-08-07 PROCEDURE — 94660 CPAP INITIATION&MGMT: CPT

## 2021-08-07 PROCEDURE — C9113 INJ PANTOPRAZOLE SODIUM, VIA: HCPCS | Performed by: SURGERY

## 2021-08-07 PROCEDURE — 85018 HEMOGLOBIN: CPT

## 2021-08-07 PROCEDURE — 6370000000 HC RX 637 (ALT 250 FOR IP): Performed by: STUDENT IN AN ORGANIZED HEALTH CARE EDUCATION/TRAINING PROGRAM

## 2021-08-07 PROCEDURE — 36415 COLL VENOUS BLD VENIPUNCTURE: CPT

## 2021-08-07 PROCEDURE — 6360000002 HC RX W HCPCS: Performed by: SURGERY

## 2021-08-07 PROCEDURE — 2580000003 HC RX 258: Performed by: SURGERY

## 2021-08-07 PROCEDURE — 2060000000 HC ICU INTERMEDIATE R&B

## 2021-08-07 PROCEDURE — 6360000002 HC RX W HCPCS: Performed by: INTERNAL MEDICINE

## 2021-08-07 RX ADMIN — ARFORMOTEROL TARTRATE 15 MCG: 15 SOLUTION RESPIRATORY (INHALATION) at 21:00

## 2021-08-07 RX ADMIN — SUCRALFATE 1 G: 1 TABLET ORAL at 01:25

## 2021-08-07 RX ADMIN — PREDNISONE 20 MG: 20 TABLET ORAL at 08:40

## 2021-08-07 RX ADMIN — ARFORMOTEROL TARTRATE 15 MCG: 15 SOLUTION RESPIRATORY (INHALATION) at 09:28

## 2021-08-07 RX ADMIN — BUDESONIDE 500 MCG: 0.5 SUSPENSION RESPIRATORY (INHALATION) at 21:00

## 2021-08-07 RX ADMIN — SUCRALFATE 1 G: 1 TABLET ORAL at 18:43

## 2021-08-07 RX ADMIN — Medication 1 TABLET: at 08:41

## 2021-08-07 RX ADMIN — AMPICILLIN SODIUM AND SULBACTAM SODIUM 3000 MG: 2; 1 INJECTION, POWDER, FOR SOLUTION INTRAMUSCULAR; INTRAVENOUS at 12:30

## 2021-08-07 RX ADMIN — HYDRALAZINE HYDROCHLORIDE 10 MG: 20 INJECTION INTRAMUSCULAR; INTRAVENOUS at 08:52

## 2021-08-07 RX ADMIN — AMPICILLIN SODIUM AND SULBACTAM SODIUM 3000 MG: 2; 1 INJECTION, POWDER, FOR SOLUTION INTRAMUSCULAR; INTRAVENOUS at 18:39

## 2021-08-07 RX ADMIN — PANTOPRAZOLE SODIUM 40 MG: 40 INJECTION, POWDER, FOR SOLUTION INTRAVENOUS at 20:25

## 2021-08-07 RX ADMIN — BUDESONIDE 500 MCG: 0.5 SUSPENSION RESPIRATORY (INHALATION) at 09:28

## 2021-08-07 RX ADMIN — Medication 10 ML: at 21:30

## 2021-08-07 RX ADMIN — AMPICILLIN SODIUM AND SULBACTAM SODIUM 3000 MG: 2; 1 INJECTION, POWDER, FOR SOLUTION INTRAMUSCULAR; INTRAVENOUS at 01:10

## 2021-08-07 RX ADMIN — AMPICILLIN SODIUM AND SULBACTAM SODIUM 3000 MG: 2; 1 INJECTION, POWDER, FOR SOLUTION INTRAMUSCULAR; INTRAVENOUS at 05:48

## 2021-08-07 RX ADMIN — SUCRALFATE 1 G: 1 TABLET ORAL at 06:20

## 2021-08-07 RX ADMIN — LORAZEPAM 2 MG: 2 INJECTION INTRAMUSCULAR; INTRAVENOUS at 12:48

## 2021-08-07 RX ADMIN — PANTOPRAZOLE SODIUM 40 MG: 40 INJECTION, POWDER, FOR SOLUTION INTRAVENOUS at 06:58

## 2021-08-07 RX ADMIN — FOLIC ACID 1 MG: 5 INJECTION, SOLUTION INTRAMUSCULAR; INTRAVENOUS; SUBCUTANEOUS at 10:00

## 2021-08-07 RX ADMIN — ENOXAPARIN SODIUM 40 MG: 40 INJECTION SUBCUTANEOUS at 08:42

## 2021-08-07 RX ADMIN — SODIUM CHLORIDE, PRESERVATIVE FREE 10 ML: 5 INJECTION INTRAVENOUS at 20:25

## 2021-08-07 RX ADMIN — LISINOPRIL 5 MG: 5 TABLET ORAL at 08:41

## 2021-08-07 RX ADMIN — SUCRALFATE 1 G: 1 TABLET ORAL at 12:30

## 2021-08-07 RX ADMIN — ATORVASTATIN CALCIUM 20 MG: 20 TABLET, FILM COATED ORAL at 08:41

## 2021-08-07 RX ADMIN — THIAMINE HYDROCHLORIDE 100 MG: 100 INJECTION, SOLUTION INTRAMUSCULAR; INTRAVENOUS at 08:41

## 2021-08-07 RX ADMIN — AMLODIPINE BESYLATE 5 MG: 5 TABLET ORAL at 08:41

## 2021-08-07 RX ADMIN — Medication 10 ML: at 08:42

## 2021-08-07 ASSESSMENT — PAIN SCALES - GENERAL
PAINLEVEL_OUTOF10: 0

## 2021-08-07 NOTE — PROGRESS NOTES
Hospitalist Progress Note      SYNOPSIS: Patient admitted on 2021 for altered mental status was reported to have hematemesis and hypoxia. Patient admitted for aspiration pneumonia and hematemesis. He has a history of binging on alcohol  Patient denies having any PCP. SUBJECTIVE:     Patient seen and examined  Records reviewed. Patient reporting burning \"like a volcano\" pain in his left chest after eating solid food, states he is able to eat jello/popsicles/ice cream and corn flakes. Reports his cough is improving. Stable overnight. No other overnight issues reported. Temp (24hrs), Av.1 °F (36.7 °C), Min:97.9 °F (36.6 °C), Max:98.2 °F (36.8 °C)    DIET: ADULT DIET; Regular; GI Faxon (GERD/Peptic Ulcer)  CODE: Full Code    Intake/Output Summary (Last 24 hours) at 2021 1049  Last data filed at 2021 1033  Gross per 24 hour   Intake 250 ml   Output --   Net 250 ml       OBJECTIVE:    BP (!) 191/98   Pulse 80   Temp 97.9 °F (36.6 °C) (Temporal)   Resp 18   Ht 5' 6\" (1.676 m)   Wt 140 lb (63.5 kg)   SpO2 96%   BMI 22.60 kg/m²     General appearance: No apparent distress, appears stated age and cooperative. Ambulating around the room independently. HEENT:  Conjunctivae/corneas clear. Neck: Supple. No jugular venous distention. Respiratory: scattered posterior rhonchi bilaterally. nonlabored respirations. Cardiovascular: Regular rate rhythm, normal S1-S2  Abdomen: Soft, nontender, nondistended  Musculoskeletal: No clubbing, cyanosis, no bilateral lower extremity edema. Brisk capillary refill. Skin:  No rashes on visible skin  Neurologic: awake, alert and following commands     ASSESSMENT:  Acute respiratory insufficiency with hypoxia  Aspiration pneumonia secondary to binge drinking/hematemesis  COPD  History of alcohol binging  Hematemesis.   Atelectasis bilateral  Hilar adenopathy  Hypertension  Hyperlipidemia  Elevated procalcitonin  Gastritis  Hiatal hernia  Element of dementia  Esophagitis  Cholelithiasis  Liver lesion cyst versus other  Nonobstructing left intrarenal calculus   Medical noncompliance  Tobacco use disorder    PLAN:  Monitor white cell count and CBC  Pulmonary consult  Continue bronchodilators  S/P EGD with biopsies, continue PPI and Carafate  Taper steroids  Patient will require PFTs  Cognitive function evaluation. Repeat chest x-ray and consider switching Unasyn to Augmentin  Lovenox for DVT prophylaxis.   Continue folic acid/thiamine/MVI  Monitor for alcohol withdrawal, CIWA protocol if needed    DISPOSITION:  Pending workup/treatment    Medications:  REVIEWED DAILY    Infusion Medications    sodium chloride       Scheduled Medications    sucralfate  1 g Oral 4 times per day    predniSONE  20 mg Oral Daily    budesonide  0.5 mg Nebulization BID    And    Arformoterol Tartrate  15 mcg Nebulization BID    enoxaparin  40 mg Subcutaneous Daily    lisinopril  5 mg Oral Daily    folic acid  1 mg Intravenous Daily    amLODIPine  5 mg Oral Daily    atorvastatin  20 mg Oral Daily    ampicillin-sulbactam  3,000 mg Intravenous Q6H    sodium chloride flush  5-40 mL Intravenous 2 times per day    pantoprazole  40 mg Intravenous Q12H    And    sodium chloride (PF)  10 mL Intravenous Q12H    multivitamin  1 tablet Oral Daily    thiamine  100 mg Intravenous Daily     PRN Meds: albuterol, hydrALAZINE, labetalol, sodium chloride flush, sodium chloride, ondansetron **OR** ondansetron, acetaminophen **OR** acetaminophen, LORazepam **OR** LORazepam **OR** LORazepam **OR** LORazepam **OR** LORazepam **OR** LORazepam **OR** LORazepam **OR** LORazepam    Labs:     Recent Labs     08/06/21 2015 08/07/21  0027 08/07/21  0747   HGB 12.3* 11.6* 12.4*   HCT 42.0 38.9 42.2       Recent Labs     08/05/21  0605      K 4.0   *   CO2 31*   BUN 21   CREATININE 0.9   CALCIUM 8.7       No results for input(s): PROT, ALB, ALKPHOS, ALT, AST, BILITOT, AMYLASE, LIPASE in the last 72 hours. No results for input(s): INR in the last 72 hours. No results for input(s): Alden [place]utes in the last 72 hours.     Chronic labs:    Lab Results   Component Value Date    INR 1.1 07/02/2017       Radiology: REVIEWED DAILY    +++++++++++++++++++++++++++++++++++++++++++++++++  Consuelo Xie[address]  +++++++++++++++++++++++++++++++++++++++++++++++++

## 2021-08-07 NOTE — PROGRESS NOTES
Monty Coon M.D.,[facility]  Mona Baumgarten, D.O., FEMMAOMALATHI., [place] M.D. Saleem Hernández M.D. Janette Cat D.O. Daily Pulmonary Progress Note    Patient: Erik Martinez 65 y.o. male MRN: [id number]     Date of Service: 8/7/2021      Synopsis     We are following patient for abnormal CT scan    \"CC\"     Code status:      Subjective      Patient was seen and examined. Lying in bed on Room air, in NAD. continues to have a productive cough      Review of Systems:  Constitutional: Denies fever, weight loss, night sweats, and fatigue  Skin: Denies pigmentation, dark lesions, and rashes   HEENT: Denies hearing loss, tinnitus, ear drainage, epistaxis, sore throat, and hoarseness. Cardiovascular: Denies palpitations, chest pain, and chest pressure. Respiratory: has chronic cough, dyspnea at rest, hemoptysis, apnea, and choking.   Gastrointestinal: Denies nausea, vomiting, poor appetite, diarrhea, heartburn or reflux  Genitourinary: Denies dysuria, frequency, urgency or hematuria  Musculoskeletal: Denies myalgias, muscle weakness, and bone pain  Neurological: Denies dizziness, vertigo, headache, and focal weakness  Psychological: Denies anxiety and depression  Endocrine: Denies heat intolerance and cold intolerance  Hematopoietic/Lymphatic: Denies bleeding problems and blood transfusions    24-hour events:      Objective   Vitals: BP (!) 146/86   Pulse 98   Temp 98.4 °F (36.9 °C) (Temporal)   Resp 18   Ht 5' 6\" (1.676 m)   Wt 140 lb (63.5 kg)   SpO2 97%   BMI 22.60 kg/m²     I/O:    Intake/Output Summary (Last 24 hours) at 8/7/2021 1656  Last data filed at 8/7/2021 1033  Gross per 24 hour   Intake 10 ml   Output --   Net 10 ml       Vent Information  Skin Assessment: Clean, dry, & intact  Equipment ID: [id number]  FiO2 : 40 %  SpO2: 97 %  I Time/ I Time %: 0.9 s  Mask Type: Full face mask  Mask Size: Large       IPAP: 12 cmH20  CPAP/EPAP: 8 cmH2O     CURRENT MEDS :  Scheduled Meds:   sucralfate 1 g Oral 4 times per day    predniSONE  20 mg Oral Daily    budesonide  0.5 mg Nebulization BID    And    Arformoterol Tartrate  15 mcg Nebulization BID    enoxaparin  40 mg Subcutaneous Daily    lisinopril  5 mg Oral Daily    folic acid  1 mg Intravenous Daily    amLODIPine  5 mg Oral Daily    atorvastatin  20 mg Oral Daily    ampicillin-sulbactam  3,000 mg Intravenous Q6H    sodium chloride flush  5-40 mL Intravenous 2 times per day    pantoprazole  40 mg Intravenous Q12H    And    sodium chloride (PF)  10 mL Intravenous Q12H    multivitamin  1 tablet Oral Daily    thiamine  100 mg Intravenous Daily       Physical Exam:  General Appearance: appears comfortable in no acute distress. HEENT: Normocephalic atraumatic without obvious abnormality   Neck: Lips, mucosa, and tongue normal.  Supple, symmetrical, trachea midline, no adenopathy;thyroid:  no enlargement/tenderness/nodules or JVD. Lung: Breath sounds CTA. Respirations   unlabored. Symmetrical expansion. Heart: RRR, normal S1, S2. No MRG  Abdomen: Soft, NT, ND. BS present x 4 quadrants. No bruit or organomegaly. Extremities: Pedal pulses 2+ symmetric b/l. Extremities normal, no cyanosis, clubbing, or edema. Musculokeletal: No joint swelling, no muscle tenderness. ROM normal in all joints of extremities. Neurologic: Mental status: Alert and Oriented X3 . Pertinent/ New Labs and Imaging Studies     Imaging Personally Reviewed:          Narrative   EXAMINATION:   CT OF THE CHEST WITH AND WITHOUT CONTRAST 8/6/2021 8:27 pm       TECHNIQUE:   CT of the chest was performed without and with the administration of   intravenous contrast. Multiplanar reformatted images are provided for review.    Dose modulation, iterative reconstruction, and/or weight based adjustment of   the mA/kV was utilized to reduce the radiation dose to as low as reasonably   achievable.       COMPARISON:   CT chest July 30, 2021       HISTORY:   [address] HISTORY: Right hilar mass/ atelectasis   TECHNOLOGIST PROVIDED HISTORY:   Reason for exam:->Right hilar mass/ atelectasis   What reading provider will be dictating this exam?->CRC       FINDINGS:   Mediastinum: Prominent subcarinal lymph node measures up to 1.2 cm partial   calcifications.  The pulmonary trunk is normal in size       Lungs/pleura:   Pleural thickening bronchiectasis seen in the middle lobe. Recommend of mild-to-moderate emphysema.       Upper Abdomen: Gallstones layering within the gallbladder.  No   pericholecystic fat stranding.       Soft Tissues/Bones: No aggressive osseous lesions.           Impression   Partially calcified right hilar and subcarinal lymph nodes.  Findings may be   secondary to response to therapy.       Bronchiectasis in the middle lobe may be secondary to therapy.       No suspicious pulmonary lesions identified. ECHO      Labs:  Lab Results   Component Value Date    WBC 19.9 08/04/2021    HGB 13.0 08/07/2021    HCT 40.8 08/07/2021    MCV 79.1 08/04/2021    MCH 23.1 08/04/2021    MCHC 29.2 08/04/2021    RDW 14.4 08/04/2021     08/04/2021    MPV 10.9 08/04/2021     Lab Results   Component Value Date     08/05/2021    K 4.0 08/05/2021     08/05/2021    CO2 31 08/05/2021    BUN 21 08/05/2021    CREATININE 0.9 08/05/2021    LABALBU 4.6 08/04/2021    CALCIUM 8.7 08/05/2021    GFRAA >60 08/05/2021    LABGLOM >60 08/05/2021     Lab Results   Component Value Date    PROTIME 12.1 07/02/2017    INR 1.1 07/02/2017     No results for input(s): PROBNP in the last 72 hours. Recent Labs     08/05/21  0605   PROCAL 2.84*     This SmartLink has not been configured with any valid records. Micro:  No results for input(s): CULTRESP in the last 72 hours. No results for input(s): LABGRAM in the last 72 hours. No results for input(s): LEGUR in the last 72 hours. No results for input(s): STREPNEUMAGU in the last 72 hours.   No results for input(s): LP1UAG in the

## 2021-08-07 NOTE — PROGRESS NOTES
SPEECH/LANGUAGE PATHOLOGY  SPEECH/LANGUAGE/COGNITIVE EVALUATION   and PLAN OF CARE      PATIENT NAME:  Nohemy Mendoza  (male)     MRN:  [id number]    :  1950  (65 y.o.)  STATUS:  Inpatient: Room 7415/7415-A    TODAY'S DATE:  21 1430   SLP cognitive language evaluation Start: 21 1430, End: 21 1430, ONE TIME, Standing Count: 1 Occurrences, R    Dez Grewal MD  REASON FOR REFERRAL:  eval  EVALUATING THERAPIST: BHARGAV Maxwell    ADMITTING DIAGNOSIS: Hematemesis [K92.0]  Lactic acidosis [E87.2]  PAO (acute kidney injury) ([address] [N17.9]  Aspiration pneumonia of right lower lobe, unspecified aspiration pneumonia type ([address] [J69.0]  Leukocytosis, unspecified type [D72.829]    VISIT DIAGNOSIS:   Visit Diagnoses       Codes    Aspiration pneumonia of right lower lobe, unspecified aspiration pneumonia type ([address]    -  Primary J69.0    PAO (acute kidney injury) ([address]     N17.9    Lactic acidosis     E87.2    Leukocytosis, unspecified type     D72.829           SPEECH THERAPY  PLAN OF CARE   The speech therapy  POC is established based on physician order, speech pathology diagnosis and results of clinical assessment     SPEECH PATHOLOGY DIAGNOSIS:    Moderate+ cognitive deficits    Suspect baseline cognitive deficits, not new/ worsened since hospitlization    Speech Pathology intervention is recommended 3-6 times per week for LOS or when goals are met with emphasis on the following:      Conditions Requiring Skilled Therapeutic Intervention for speech, language and/or cognition    Cognitive linguistic impairment    Specific Speech Therapy Interventions to Include: Therapeutic tasks for Cognition    Specific instructions for next treatment: To initiate POC    SHORT/LONG TERM GOALS  Pt will improve orientation to spatial and temporal surroundings with use of external memory aides.   Pt will improve immediate, short term, recent memory during structured and unstructured tasks with 75% accuracy   Pt will improve problem solving/thought organization during structured and unstructured tasks with 75% accuracy   Pt will improve receptive and expressive language skills with adequate thought content, organization, and processing time to facilitate improved communication with moderate. Patient goals: Patient/family involved in developing goals and treatment plan:   Treatment goals discussed with Patient    The Patient understand(s) the diagnosis, prognosis and plan of care   The patient/family Did not state,     This plan may be re-evaluated and revised as warranted. Rehabilitation Potential/Prognosis: fair                CLINICAL ASSESSMENT:  MOTOR SPEECH       Oral Peripheral Examination   Adequate lingual/labial strength     Parameters of Speech Production  Respiration:  Adequate for speech production  Articulation:  Distortion  Resonance:  Within functional limits  Quality:   Harsh  Pitch: Within functional limits  Intensity: Within functional limits  Fluency:  Intact  Prosody Intact    COGNITION     Attention/Orientation  Attention: Sustained attention   Orientation:  Oriented to Person, Place, Date, Reason for hospitalization    SLUMS ASSESSMENT:  Assesses cognitive ability in areas of orientation, immediate/ STM recall, divergent naming, functional calculation, abstraction, mental manipulation, clock draw, and story comprehension. Pt received a score of 17/30, which may be consistent with a dementia diagnosis (per scoring criteria, could be clinically correlated with Dementia diagnosis if other symptoms persist; defer to physician for further testing if that diagnosis is suspected).           CLINICAL OBSERVATIONS NOTED DURING THE EVALUATION  Latent responses, Inconsistent responses, Perseveration errors and Cueing was required                  EDUCATION:   The Speech Language Pathologist (SLP) completed education regarding results of evaluation and that intervention is warranted at this time. Learner: Patient  Education: Reviewed results and recommendations of this evaluation  Evaluation of Education:  Needs further instruction    Evaluation Time includes thorough review of current medical information, gathering information on past medical history/social history and prior level of function, completion of standardized testing/informal observation of tasks, assessment of data and education on plan of care and goals. CPT code:    73762  eval speech sound lang comprehension      The admitting diagnosis and active problem list, as listed below have been reviewed prior to initiation of this evaluation.         ACTIVE PROBLEM LIST:   Patient Active Problem List   Diagnosis    Dyspnea    COPD exacerbation ([address]    Essential hypertension    Respiratory distress    Acute respiratory failure with hypoxia ([address]    Gallstones    COPD (chronic obstructive pulmonary disease) ([address]    ETOH abuse    Hematemesis    Pneumonia       Aure Morel, CCC-SLP  Speech-Language Pathologist  [id number]  8/7/2021

## 2021-08-08 LAB
ALBUMIN SERPL-MCNC: 3.6 G/DL (ref 3.5–5.2)
ALP BLD-CCNC: 64 U/L (ref 40–129)
ALT SERPL-CCNC: 10 U/L (ref 0–40)
ANION GAP SERPL CALCULATED.3IONS-SCNC: 10 MMOL/L (ref 7–16)
AST SERPL-CCNC: 12 U/L (ref 0–39)
BASOPHILS ABSOLUTE: 0.01 E9/L (ref 0–0.2)
BASOPHILS RELATIVE PERCENT: 0.1 % (ref 0–2)
BILIRUB SERPL-MCNC: 0.8 MG/DL (ref 0–1.2)
BUN BLDV-MCNC: 15 MG/DL (ref 6–23)
CALCIUM SERPL-MCNC: 8.9 MG/DL (ref 8.6–10.2)
CHLORIDE BLD-SCNC: 101 MMOL/L (ref 98–107)
CO2: 27 MMOL/L (ref 22–29)
CREAT SERPL-MCNC: 0.8 MG/DL (ref 0.7–1.2)
EOSINOPHILS ABSOLUTE: 0.02 E9/L (ref 0.05–0.5)
EOSINOPHILS RELATIVE PERCENT: 0.2 % (ref 0–6)
GFR AFRICAN AMERICAN: >60
GFR NON-AFRICAN AMERICAN: >60 ML/MIN/1.73
GLUCOSE BLD-MCNC: 93 MG/DL (ref 74–99)
HCT VFR BLD CALC: 40.9 % (ref 37–54)
HEMOGLOBIN: 12.3 G/DL (ref 12.5–16.5)
IMMATURE GRANULOCYTES #: 0.04 E9/L
IMMATURE GRANULOCYTES %: 0.4 % (ref 0–5)
LYMPHOCYTES ABSOLUTE: 2.46 E9/L (ref 1.5–4)
LYMPHOCYTES RELATIVE PERCENT: 25.8 % (ref 20–42)
MCH RBC QN AUTO: 22.9 PG (ref 26–35)
MCHC RBC AUTO-ENTMCNC: 30.1 % (ref 32–34.5)
MCV RBC AUTO: 76.2 FL (ref 80–99.9)
MONOCYTES ABSOLUTE: 0.83 E9/L (ref 0.1–0.95)
MONOCYTES RELATIVE PERCENT: 8.7 % (ref 2–12)
NEUTROPHILS ABSOLUTE: 6.18 E9/L (ref 1.8–7.3)
NEUTROPHILS RELATIVE PERCENT: 64.8 % (ref 43–80)
PDW BLD-RTO: 13.4 FL (ref 11.5–15)
PLATELET # BLD: 206 E9/L (ref 130–450)
PMV BLD AUTO: 10.7 FL (ref 7–12)
POTASSIUM SERPL-SCNC: 3.3 MMOL/L (ref 3.5–5)
RBC # BLD: 5.37 E12/L (ref 3.8–5.8)
SODIUM BLD-SCNC: 138 MMOL/L (ref 132–146)
TOTAL PROTEIN: 5.9 G/DL (ref 6.4–8.3)
WBC # BLD: 9.5 E9/L (ref 4.5–11.5)

## 2021-08-08 PROCEDURE — 6370000000 HC RX 637 (ALT 250 FOR IP): Performed by: STUDENT IN AN ORGANIZED HEALTH CARE EDUCATION/TRAINING PROGRAM

## 2021-08-08 PROCEDURE — 6360000002 HC RX W HCPCS: Performed by: SURGERY

## 2021-08-08 PROCEDURE — 6370000000 HC RX 637 (ALT 250 FOR IP): Performed by: FAMILY MEDICINE

## 2021-08-08 PROCEDURE — 36415 COLL VENOUS BLD VENIPUNCTURE: CPT

## 2021-08-08 PROCEDURE — 2580000003 HC RX 258: Performed by: SURGERY

## 2021-08-08 PROCEDURE — 2500000003 HC RX 250 WO HCPCS: Performed by: SURGERY

## 2021-08-08 PROCEDURE — 85025 COMPLETE CBC W/AUTO DIFF WBC: CPT

## 2021-08-08 PROCEDURE — 94640 AIRWAY INHALATION TREATMENT: CPT

## 2021-08-08 PROCEDURE — C9113 INJ PANTOPRAZOLE SODIUM, VIA: HCPCS | Performed by: SURGERY

## 2021-08-08 PROCEDURE — 80053 COMPREHEN METABOLIC PANEL: CPT

## 2021-08-08 PROCEDURE — 6370000000 HC RX 637 (ALT 250 FOR IP): Performed by: NURSE PRACTITIONER

## 2021-08-08 PROCEDURE — 6360000002 HC RX W HCPCS: Performed by: INTERNAL MEDICINE

## 2021-08-08 PROCEDURE — 6370000000 HC RX 637 (ALT 250 FOR IP): Performed by: SURGERY

## 2021-08-08 PROCEDURE — 2060000000 HC ICU INTERMEDIATE R&B

## 2021-08-08 PROCEDURE — 94660 CPAP INITIATION&MGMT: CPT

## 2021-08-08 PROCEDURE — 6370000000 HC RX 637 (ALT 250 FOR IP): Performed by: INTERNAL MEDICINE

## 2021-08-08 RX ORDER — POTASSIUM CHLORIDE 20 MEQ/1
40 TABLET, EXTENDED RELEASE ORAL PRN
Status: DISCONTINUED | OUTPATIENT
Start: 2021-08-08 | End: 2021-08-09 | Stop reason: HOSPADM

## 2021-08-08 RX ORDER — AMLODIPINE BESYLATE 10 MG/1
10 TABLET ORAL DAILY
Status: DISCONTINUED | OUTPATIENT
Start: 2021-08-09 | End: 2021-08-09 | Stop reason: HOSPADM

## 2021-08-08 RX ORDER — AMOXICILLIN AND CLAVULANATE POTASSIUM 875; 125 MG/1; MG/1
1 TABLET, FILM COATED ORAL EVERY 12 HOURS SCHEDULED
Status: DISCONTINUED | OUTPATIENT
Start: 2021-08-08 | End: 2021-08-09 | Stop reason: HOSPADM

## 2021-08-08 RX ORDER — POTASSIUM CHLORIDE 7.45 MG/ML
10 INJECTION INTRAVENOUS PRN
Status: DISCONTINUED | OUTPATIENT
Start: 2021-08-08 | End: 2021-08-09 | Stop reason: HOSPADM

## 2021-08-08 RX ADMIN — AMOXICILLIN AND CLAVULANATE POTASSIUM 1 TABLET: 875; 125 TABLET, FILM COATED ORAL at 21:16

## 2021-08-08 RX ADMIN — Medication 10 ML: at 21:52

## 2021-08-08 RX ADMIN — ATORVASTATIN CALCIUM 20 MG: 20 TABLET, FILM COATED ORAL at 09:03

## 2021-08-08 RX ADMIN — AMPICILLIN SODIUM AND SULBACTAM SODIUM 3000 MG: 2; 1 INJECTION, POWDER, FOR SOLUTION INTRAMUSCULAR; INTRAVENOUS at 11:24

## 2021-08-08 RX ADMIN — ARFORMOTEROL TARTRATE 15 MCG: 15 SOLUTION RESPIRATORY (INHALATION) at 09:41

## 2021-08-08 RX ADMIN — Medication 10 ML: at 21:16

## 2021-08-08 RX ADMIN — SUCRALFATE 1 G: 1 TABLET ORAL at 17:47

## 2021-08-08 RX ADMIN — LISINOPRIL 5 MG: 5 TABLET ORAL at 09:03

## 2021-08-08 RX ADMIN — SUCRALFATE 1 G: 1 TABLET ORAL at 11:24

## 2021-08-08 RX ADMIN — BUDESONIDE 500 MCG: 0.5 SUSPENSION RESPIRATORY (INHALATION) at 09:41

## 2021-08-08 RX ADMIN — PANTOPRAZOLE SODIUM 40 MG: 40 INJECTION, POWDER, FOR SOLUTION INTRAVENOUS at 21:52

## 2021-08-08 RX ADMIN — SUCRALFATE 1 G: 1 TABLET ORAL at 00:11

## 2021-08-08 RX ADMIN — ENOXAPARIN SODIUM 40 MG: 40 INJECTION SUBCUTANEOUS at 09:02

## 2021-08-08 RX ADMIN — HYDRALAZINE HYDROCHLORIDE 10 MG: 20 INJECTION INTRAMUSCULAR; INTRAVENOUS at 09:03

## 2021-08-08 RX ADMIN — SUCRALFATE 1 G: 1 TABLET ORAL at 06:34

## 2021-08-08 RX ADMIN — LORAZEPAM 2 MG: 2 INJECTION INTRAMUSCULAR; INTRAVENOUS at 11:59

## 2021-08-08 RX ADMIN — PANTOPRAZOLE SODIUM 40 MG: 40 INJECTION, POWDER, FOR SOLUTION INTRAVENOUS at 06:45

## 2021-08-08 RX ADMIN — AMPICILLIN SODIUM AND SULBACTAM SODIUM 3000 MG: 2; 1 INJECTION, POWDER, FOR SOLUTION INTRAMUSCULAR; INTRAVENOUS at 06:34

## 2021-08-08 RX ADMIN — Medication 1 TABLET: at 09:02

## 2021-08-08 RX ADMIN — BUDESONIDE 500 MCG: 0.5 SUSPENSION RESPIRATORY (INHALATION) at 20:29

## 2021-08-08 RX ADMIN — THIAMINE HYDROCHLORIDE 100 MG: 100 INJECTION, SOLUTION INTRAMUSCULAR; INTRAVENOUS at 09:03

## 2021-08-08 RX ADMIN — SUCRALFATE 1 G: 1 TABLET ORAL at 23:35

## 2021-08-08 RX ADMIN — FOLIC ACID 1 MG: 5 INJECTION, SOLUTION INTRAMUSCULAR; INTRAVENOUS; SUBCUTANEOUS at 10:36

## 2021-08-08 RX ADMIN — AMLODIPINE BESYLATE 5 MG: 5 TABLET ORAL at 09:03

## 2021-08-08 RX ADMIN — SODIUM CHLORIDE, PRESERVATIVE FREE 10 ML: 5 INJECTION INTRAVENOUS at 06:34

## 2021-08-08 RX ADMIN — PREDNISONE 20 MG: 20 TABLET ORAL at 09:02

## 2021-08-08 RX ADMIN — Medication 10 ML: at 09:10

## 2021-08-08 RX ADMIN — ARFORMOTEROL TARTRATE 15 MCG: 15 SOLUTION RESPIRATORY (INHALATION) at 20:30

## 2021-08-08 RX ADMIN — POTASSIUM CHLORIDE 40 MEQ: 1500 TABLET, EXTENDED RELEASE ORAL at 10:36

## 2021-08-08 ASSESSMENT — PAIN SCALES - GENERAL
PAINLEVEL_OUTOF10: 0

## 2021-08-08 NOTE — PROGRESS NOTES
Assumed care of the patient at 7:30 am he is alert and watching TV. Patient is to be NPO tonight for Bronch tomorrow. Denies any pain and vital signs are stable at this time. Patient continues to have elevated BP gave hydralazine. May need to schedule hydralazine.

## 2021-08-08 NOTE — PLAN OF CARE
Problem: Falls - Risk of:  Goal: Will remain free from falls  Description: Will remain free from falls  Outcome: Met This Shift     Problem: Falls - Risk of:  Goal: Absence of physical injury  Description: Absence of physical injury  Outcome: Met This Shift     Problem: Pain:  Goal: Control of acute pain  Description: Control of acute pain  Outcome: Met This Shift     Problem: Coping:  Goal: Level of anxiety will decrease  Description: Level of anxiety will decrease  Outcome: Met This Shift     Problem: Respiratory:  Goal: Ability to maintain a clear airway will improve  Description: Ability to maintain a clear airway will improve  Outcome: Met This Shift

## 2021-08-08 NOTE — PROGRESS NOTES
Hospitalist Progress Note      SYNOPSIS: Patient admitted on 2021 for altered mental status was reported to have hematemesis and hypoxia. Patient admitted for aspiration pneumonia and hematemesis. He has a history of binging on alcohol. Patient denies having any PCP. SUBJECTIVE:     Patient seen and examined, resting in bed, no complaints. Wants to discuss insurance questions. Reports his daughter lives across the street from him and assists with laundry/cooking. Records reviewed. Stable overnight. No other overnight issues reported. Temp (24hrs), Av.8 °F (37.1 °C), Min:98 °F (36.7 °C), Max:99.6 °F (37.6 °C)    DIET: Diet NPO Exceptions are: Ice Chips, Sips of Water with Meds  CODE: Full Code    Intake/Output Summary (Last 24 hours) at 2021 1003  Last data filed at 2021 0912  Gross per 24 hour   Intake 0 ml   Output 0 ml   Net 0 ml       OBJECTIVE:    BP (!) 168/111   Pulse 88   Temp 99.6 °F (37.6 °C) (Temporal)   Resp 18   Ht 5' 6\" (1.676 m)   Wt 140 lb (63.5 kg)   SpO2 96%   BMI 22.60 kg/m²     General appearance: No apparent distress, appears stated age and cooperative. Resting in bed. HEENT:  Conjunctivae/corneas clear. Neck: Supple. No jugular venous distention. Respiratory: lungs clear bilaterally. nonlabored respirations. Cardiovascular: Regular rate rhythm, normal S1-S2  Abdomen: Soft, nontender, nondistended  Musculoskeletal: No clubbing, cyanosis, no bilateral lower extremity edema. Brisk capillary refill. Skin:  No rashes on visible skin  Neurologic: awake, alert and following commands     ASSESSMENT:  Acute respiratory insufficiency with hypoxia  Aspiration pneumonia secondary to binge drinking/hematemesis  COPD without acute exacerbation  History of alcohol binging  Hematemesis.   Atelectasis bilateral  Hilar adenopathy  Hypertension- uncontrolled  Hyperlipidemia  Elevated procalcitonin  Gastritis  Hiatal hernia  Element of dementia  Esophagitis  Cholelithiasis  Liver lesion cyst versus other  Nonobstructing left intrarenal calculus   Medical noncompliance  Tobacco use disorder  PAO  Lactic acidosis  Leukocytosis  Cognitive deficit, suspected dementia    PLAN:  Monitor white cell count and CBC  Pulmonary consult, bronchoscopy/EBUS scheduled for 8/9, outpatient PFT  Continue bronchodilators  S/P EGD with biopsies, continue PPI and Carafate  Prednisone 20mg x 4 more days  Patient will require PFTs  Cognitive function evaluation scored 17/30 which may be consistent with dementia  Unasyn will be switched to Augmentin on discharge  Lovenox for DVT prophylaxis.   Continue folic acid/thiamine/MVI  Monitor for alcohol withdrawal, CIWA protocol if needed, reports 3-4 glasses of vodka daily and not interested in stopping    DISPOSITION:  Pending workup/treatment    Medications:  REVIEWED DAILY    Infusion Medications    sodium chloride       Scheduled Medications    sucralfate  1 g Oral 4 times per day    predniSONE  20 mg Oral Daily    budesonide  0.5 mg Nebulization BID    And    Arformoterol Tartrate  15 mcg Nebulization BID    enoxaparin  40 mg Subcutaneous Daily    lisinopril  5 mg Oral Daily    folic acid  1 mg Intravenous Daily    amLODIPine  5 mg Oral Daily    atorvastatin  20 mg Oral Daily    ampicillin-sulbactam  3,000 mg Intravenous Q6H    sodium chloride flush  5-40 mL Intravenous 2 times per day    pantoprazole  40 mg Intravenous Q12H    And    sodium chloride (PF)  10 mL Intravenous Q12H    multivitamin  1 tablet Oral Daily    thiamine  100 mg Intravenous Daily     PRN Meds: albuterol, hydrALAZINE, labetalol, sodium chloride flush, sodium chloride, ondansetron **OR** ondansetron, acetaminophen **OR** acetaminophen, LORazepam **OR** LORazepam **OR** LORazepam **OR** LORazepam **OR** LORazepam **OR** LORazepam **OR** LORazepam **OR** LORazepam    Labs:     Recent Labs     08/07/21  0747 08/07/21  1358 08/08/21  9597 WBC  --   --  9.5   HGB 12.4* 13.0 12.3*   HCT 42.2 40.8 40.9   PLT  --   --  206       Recent Labs     08/08/21  0826      K 3.3*      CO2 27   BUN 15   CREATININE 0.8   CALCIUM 8.9       Recent Labs     08/08/21  0826   PROT 5.9*   ALKPHOS 64   ALT 10   AST 12   BILITOT 0.8       No results for input(s): INR in the last 72 hours. No results for input(s): Ayala Lucas in the last 72 hours.     Chronic labs:    Lab Results   Component Value Date    INR 1.1 07/02/2017       Radiology: REVIEWED DAILY    +++++++++++++++++++++++++++++++++++++++++++++++++  ALLIE Sanchez/ Wu [address]  +++++++++++++++++++++++++++++++++++++++++++++++++

## 2021-08-09 ENCOUNTER — ANESTHESIA (OUTPATIENT)
Dept: ENDOSCOPY | Age: 71
DRG: 177 | End: 2021-08-09
Payer: MEDICARE

## 2021-08-09 ENCOUNTER — APPOINTMENT (OUTPATIENT)
Dept: GENERAL RADIOLOGY | Age: 71
DRG: 177 | End: 2021-08-09
Payer: MEDICARE

## 2021-08-09 ENCOUNTER — ANESTHESIA EVENT (OUTPATIENT)
Dept: ENDOSCOPY | Age: 71
DRG: 177 | End: 2021-08-09
Payer: MEDICARE

## 2021-08-09 VITALS
TEMPERATURE: 98.2 F | SYSTOLIC BLOOD PRESSURE: 123 MMHG | DIASTOLIC BLOOD PRESSURE: 86 MMHG | BODY MASS INDEX: 22.5 KG/M2 | OXYGEN SATURATION: 95 % | RESPIRATION RATE: 17 BRPM | HEART RATE: 103 BPM | HEIGHT: 66 IN | WEIGHT: 140 LBS

## 2021-08-09 VITALS — SYSTOLIC BLOOD PRESSURE: 107 MMHG | OXYGEN SATURATION: 100 % | DIASTOLIC BLOOD PRESSURE: 78 MMHG | TEMPERATURE: 96.1 F

## 2021-08-09 LAB
ANION GAP SERPL CALCULATED.3IONS-SCNC: 10 MMOL/L (ref 7–16)
BLOOD CULTURE, ROUTINE: NORMAL
BUN BLDV-MCNC: 16 MG/DL (ref 6–23)
CALCIUM SERPL-MCNC: 9.1 MG/DL (ref 8.6–10.2)
CHLORIDE BLD-SCNC: 102 MMOL/L (ref 98–107)
CO2: 27 MMOL/L (ref 22–29)
CREAT SERPL-MCNC: 0.9 MG/DL (ref 0.7–1.2)
CULTURE, BLOOD 2: NORMAL
GFR AFRICAN AMERICAN: >60
GFR NON-AFRICAN AMERICAN: >60 ML/MIN/1.73
GLUCOSE BLD-MCNC: 93 MG/DL (ref 74–99)
MAGNESIUM: 2.1 MG/DL (ref 1.6–2.6)
POTASSIUM REFLEX MAGNESIUM: 3.1 MMOL/L (ref 3.5–5)
SODIUM BLD-SCNC: 139 MMOL/L (ref 132–146)

## 2021-08-09 PROCEDURE — 83735 ASSAY OF MAGNESIUM: CPT

## 2021-08-09 PROCEDURE — 3609010800 HC BRONCHOSCOPY ALVEOLAR LAVAGE: Performed by: INTERNAL MEDICINE

## 2021-08-09 PROCEDURE — 6370000000 HC RX 637 (ALT 250 FOR IP): Performed by: STUDENT IN AN ORGANIZED HEALTH CARE EDUCATION/TRAINING PROGRAM

## 2021-08-09 PROCEDURE — 6370000000 HC RX 637 (ALT 250 FOR IP): Performed by: NURSE PRACTITIONER

## 2021-08-09 PROCEDURE — 87070 CULTURE OTHR SPECIMN AEROBIC: CPT

## 2021-08-09 PROCEDURE — 80048 BASIC METABOLIC PNL TOTAL CA: CPT

## 2021-08-09 PROCEDURE — 3609020000 HC BRONCHOSCOPY W/EBUS FNA: Performed by: INTERNAL MEDICINE

## 2021-08-09 PROCEDURE — 6360000002 HC RX W HCPCS: Performed by: SURGERY

## 2021-08-09 PROCEDURE — 2580000003 HC RX 258: Performed by: NURSE ANESTHETIST, CERTIFIED REGISTERED

## 2021-08-09 PROCEDURE — C9113 INJ PANTOPRAZOLE SODIUM, VIA: HCPCS | Performed by: SURGERY

## 2021-08-09 PROCEDURE — 7100000001 HC PACU RECOVERY - ADDTL 15 MIN: Performed by: INTERNAL MEDICINE

## 2021-08-09 PROCEDURE — 6360000002 HC RX W HCPCS: Performed by: NURSE ANESTHETIST, CERTIFIED REGISTERED

## 2021-08-09 PROCEDURE — 2500000003 HC RX 250 WO HCPCS: Performed by: NURSE ANESTHETIST, CERTIFIED REGISTERED

## 2021-08-09 PROCEDURE — 71045 X-RAY EXAM CHEST 1 VIEW: CPT

## 2021-08-09 PROCEDURE — 94640 AIRWAY INHALATION TREATMENT: CPT

## 2021-08-09 PROCEDURE — 87015 SPECIMEN INFECT AGNT CONCNTJ: CPT

## 2021-08-09 PROCEDURE — 6370000000 HC RX 637 (ALT 250 FOR IP): Performed by: SURGERY

## 2021-08-09 PROCEDURE — 88305 TISSUE EXAM BY PATHOLOGIST: CPT

## 2021-08-09 PROCEDURE — 94660 CPAP INITIATION&MGMT: CPT

## 2021-08-09 PROCEDURE — 2580000003 HC RX 258: Performed by: SURGERY

## 2021-08-09 PROCEDURE — 6370000000 HC RX 637 (ALT 250 FOR IP): Performed by: INTERNAL MEDICINE

## 2021-08-09 PROCEDURE — 89051 BODY FLUID CELL COUNT: CPT

## 2021-08-09 PROCEDURE — 07D78ZX EXTRACTION OF THORAX LYMPHATIC, VIA NATURAL OR ARTIFICIAL OPENING ENDOSCOPIC, DIAGNOSTIC: ICD-10-PCS | Performed by: INTERNAL MEDICINE

## 2021-08-09 PROCEDURE — 2500000003 HC RX 250 WO HCPCS: Performed by: SURGERY

## 2021-08-09 PROCEDURE — 7100000000 HC PACU RECOVERY - FIRST 15 MIN: Performed by: INTERNAL MEDICINE

## 2021-08-09 PROCEDURE — 3700000000 HC ANESTHESIA ATTENDED CARE: Performed by: INTERNAL MEDICINE

## 2021-08-09 PROCEDURE — 0B9D8ZX DRAINAGE OF RIGHT MIDDLE LUNG LOBE, VIA NATURAL OR ARTIFICIAL OPENING ENDOSCOPIC, DIAGNOSTIC: ICD-10-PCS | Performed by: INTERNAL MEDICINE

## 2021-08-09 PROCEDURE — 2709999900 HC NON-CHARGEABLE SUPPLY: Performed by: INTERNAL MEDICINE

## 2021-08-09 PROCEDURE — 3609011300 HC BRONCHOSCOPY BRONCHIAL/ENDOBRNCL BX 1+ SITES: Performed by: INTERNAL MEDICINE

## 2021-08-09 PROCEDURE — 6360000002 HC RX W HCPCS: Performed by: INTERNAL MEDICINE

## 2021-08-09 PROCEDURE — 87206 SMEAR FLUORESCENT/ACID STAI: CPT

## 2021-08-09 PROCEDURE — 88173 CYTOPATH EVAL FNA REPORT: CPT

## 2021-08-09 PROCEDURE — 6370000000 HC RX 637 (ALT 250 FOR IP): Performed by: FAMILY MEDICINE

## 2021-08-09 PROCEDURE — 87205 SMEAR GRAM STAIN: CPT

## 2021-08-09 PROCEDURE — 3700000001 HC ADD 15 MINUTES (ANESTHESIA): Performed by: INTERNAL MEDICINE

## 2021-08-09 PROCEDURE — 36415 COLL VENOUS BLD VENIPUNCTURE: CPT

## 2021-08-09 PROCEDURE — 2720000010 HC SURG SUPPLY STERILE: Performed by: INTERNAL MEDICINE

## 2021-08-09 PROCEDURE — 88112 CYTOPATH CELL ENHANCE TECH: CPT

## 2021-08-09 PROCEDURE — 87102 FUNGUS ISOLATION CULTURE: CPT

## 2021-08-09 PROCEDURE — 87116 MYCOBACTERIA CULTURE: CPT

## 2021-08-09 PROCEDURE — 0BD58ZX EXTRACTION OF RIGHT MIDDLE LOBE BRONCHUS, VIA NATURAL OR ARTIFICIAL OPENING ENDOSCOPIC, DIAGNOSTIC: ICD-10-PCS | Performed by: INTERNAL MEDICINE

## 2021-08-09 RX ORDER — ALBUTEROL SULFATE 90 UG/1
2 AEROSOL, METERED RESPIRATORY (INHALATION) 4 TIMES DAILY PRN
Qty: 1 INHALER | Refills: 3 | Status: SHIPPED | OUTPATIENT
Start: 2021-08-09

## 2021-08-09 RX ORDER — AMOXICILLIN AND CLAVULANATE POTASSIUM 875; 125 MG/1; MG/1
1 TABLET, FILM COATED ORAL EVERY 12 HOURS SCHEDULED
Qty: 6 TABLET | Refills: 0 | Status: SHIPPED | OUTPATIENT
Start: 2021-08-10 | End: 2021-08-13

## 2021-08-09 RX ORDER — FOLIC ACID 1 MG/1
1 TABLET ORAL DAILY
Qty: 30 TABLET | Refills: 0 | Status: SHIPPED | OUTPATIENT
Start: 2021-08-09

## 2021-08-09 RX ORDER — DEXAMETHASONE SODIUM PHOSPHATE 10 MG/ML
INJECTION, SOLUTION INTRAMUSCULAR; INTRAVENOUS PRN
Status: DISCONTINUED | OUTPATIENT
Start: 2021-08-09 | End: 2021-08-09 | Stop reason: SDUPTHER

## 2021-08-09 RX ORDER — ROCURONIUM BROMIDE 10 MG/ML
INJECTION, SOLUTION INTRAVENOUS PRN
Status: DISCONTINUED | OUTPATIENT
Start: 2021-08-09 | End: 2021-08-09 | Stop reason: SDUPTHER

## 2021-08-09 RX ORDER — ONDANSETRON 2 MG/ML
INJECTION INTRAMUSCULAR; INTRAVENOUS PRN
Status: DISCONTINUED | OUTPATIENT
Start: 2021-08-09 | End: 2021-08-09 | Stop reason: SDUPTHER

## 2021-08-09 RX ORDER — MULTIVITAMIN WITH IRON
1 TABLET ORAL DAILY
Qty: 30 TABLET | Refills: 1 | Status: SHIPPED | OUTPATIENT
Start: 2021-08-10

## 2021-08-09 RX ORDER — LISINOPRIL 5 MG/1
5 TABLET ORAL DAILY
Qty: 30 TABLET | Refills: 3 | Status: SHIPPED | OUTPATIENT
Start: 2021-08-10

## 2021-08-09 RX ORDER — LIDOCAINE HYDROCHLORIDE 20 MG/ML
INJECTION, SOLUTION INTRAVENOUS PRN
Status: DISCONTINUED | OUTPATIENT
Start: 2021-08-09 | End: 2021-08-09 | Stop reason: SDUPTHER

## 2021-08-09 RX ORDER — SODIUM CHLORIDE 9 MG/ML
INJECTION, SOLUTION INTRAVENOUS CONTINUOUS PRN
Status: DISCONTINUED | OUTPATIENT
Start: 2021-08-09 | End: 2021-08-09 | Stop reason: SDUPTHER

## 2021-08-09 RX ORDER — THIAMINE HYDROCHLORIDE 100 MG/ML
100 INJECTION, SOLUTION INTRAMUSCULAR; INTRAVENOUS DAILY
Qty: 1 SYRINGE | Refills: 0 | Status: SHIPPED | OUTPATIENT
Start: 2021-08-10

## 2021-08-09 RX ORDER — FENTANYL CITRATE 50 UG/ML
INJECTION, SOLUTION INTRAMUSCULAR; INTRAVENOUS PRN
Status: DISCONTINUED | OUTPATIENT
Start: 2021-08-09 | End: 2021-08-09 | Stop reason: SDUPTHER

## 2021-08-09 RX ORDER — AMLODIPINE BESYLATE 10 MG/1
10 TABLET ORAL DAILY
Qty: 30 TABLET | Refills: 3 | Status: SHIPPED | OUTPATIENT
Start: 2021-08-10

## 2021-08-09 RX ORDER — PROPOFOL 10 MG/ML
INJECTION, EMULSION INTRAVENOUS PRN
Status: DISCONTINUED | OUTPATIENT
Start: 2021-08-09 | End: 2021-08-09 | Stop reason: SDUPTHER

## 2021-08-09 RX ORDER — PREDNISONE 20 MG/1
20 TABLET ORAL DAILY
Qty: 2 TABLET | Refills: 0 | Status: SHIPPED | OUTPATIENT
Start: 2021-08-10 | End: 2021-08-12

## 2021-08-09 RX ORDER — PHENYLEPHRINE HCL IN 0.9% NACL 1 MG/10 ML
SYRINGE (ML) INTRAVENOUS PRN
Status: DISCONTINUED | OUTPATIENT
Start: 2021-08-09 | End: 2021-08-09 | Stop reason: SDUPTHER

## 2021-08-09 RX ADMIN — PREDNISONE 20 MG: 20 TABLET ORAL at 08:30

## 2021-08-09 RX ADMIN — FOLIC ACID 1 MG: 5 INJECTION, SOLUTION INTRAMUSCULAR; INTRAVENOUS; SUBCUTANEOUS at 08:40

## 2021-08-09 RX ADMIN — ARFORMOTEROL TARTRATE 15 MCG: 15 SOLUTION RESPIRATORY (INHALATION) at 08:49

## 2021-08-09 RX ADMIN — SODIUM CHLORIDE: 9 INJECTION, SOLUTION INTRAVENOUS at 12:38

## 2021-08-09 RX ADMIN — HYDRALAZINE HYDROCHLORIDE 10 MG: 20 INJECTION INTRAMUSCULAR; INTRAVENOUS at 08:26

## 2021-08-09 RX ADMIN — LIDOCAINE HYDROCHLORIDE 60 MG: 20 INJECTION, SOLUTION INTRAVENOUS at 12:04

## 2021-08-09 RX ADMIN — Medication 10 ML: at 08:41

## 2021-08-09 RX ADMIN — ENOXAPARIN SODIUM 40 MG: 40 INJECTION SUBCUTANEOUS at 08:30

## 2021-08-09 RX ADMIN — LISINOPRIL 5 MG: 5 TABLET ORAL at 08:31

## 2021-08-09 RX ADMIN — SUGAMMADEX 150 MG: 100 INJECTION, SOLUTION INTRAVENOUS at 12:47

## 2021-08-09 RX ADMIN — AMLODIPINE BESYLATE 10 MG: 10 TABLET ORAL at 08:30

## 2021-08-09 RX ADMIN — PROPOFOL 50 MCG/KG/MIN: 10 INJECTION, EMULSION INTRAVENOUS at 12:11

## 2021-08-09 RX ADMIN — ONDANSETRON HYDROCHLORIDE 4 MG: 2 INJECTION, SOLUTION INTRAMUSCULAR; INTRAVENOUS at 12:13

## 2021-08-09 RX ADMIN — DEXAMETHASONE SODIUM PHOSPHATE 10 MG: 10 INJECTION INTRAMUSCULAR; INTRAVENOUS at 12:13

## 2021-08-09 RX ADMIN — SODIUM CHLORIDE: 9 INJECTION, SOLUTION INTRAVENOUS at 12:02

## 2021-08-09 RX ADMIN — AMOXICILLIN AND CLAVULANATE POTASSIUM 1 TABLET: 875; 125 TABLET, FILM COATED ORAL at 08:30

## 2021-08-09 RX ADMIN — PANTOPRAZOLE SODIUM 40 MG: 40 INJECTION, POWDER, FOR SOLUTION INTRAVENOUS at 06:39

## 2021-08-09 RX ADMIN — Medication 200 MCG: at 12:36

## 2021-08-09 RX ADMIN — POTASSIUM CHLORIDE 40 MEQ: 1500 TABLET, EXTENDED RELEASE ORAL at 14:53

## 2021-08-09 RX ADMIN — ROCURONIUM BROMIDE 25 MG: 10 INJECTION, SOLUTION INTRAVENOUS at 12:04

## 2021-08-09 RX ADMIN — BUDESONIDE 500 MCG: 0.5 SUSPENSION RESPIRATORY (INHALATION) at 08:49

## 2021-08-09 RX ADMIN — THIAMINE HYDROCHLORIDE 100 MG: 100 INJECTION, SOLUTION INTRAMUSCULAR; INTRAVENOUS at 08:31

## 2021-08-09 RX ADMIN — Medication 1 TABLET: at 08:30

## 2021-08-09 RX ADMIN — FENTANYL CITRATE 100 MCG: 50 INJECTION, SOLUTION INTRAMUSCULAR; INTRAVENOUS at 12:04

## 2021-08-09 RX ADMIN — SODIUM CHLORIDE, PRESERVATIVE FREE 10 ML: 5 INJECTION INTRAVENOUS at 06:51

## 2021-08-09 RX ADMIN — ATORVASTATIN CALCIUM 20 MG: 20 TABLET, FILM COATED ORAL at 08:30

## 2021-08-09 RX ADMIN — PROPOFOL 100 MG: 10 INJECTION, EMULSION INTRAVENOUS at 12:04

## 2021-08-09 RX ADMIN — SUCRALFATE 1 G: 1 TABLET ORAL at 14:54

## 2021-08-09 ASSESSMENT — PULMONARY FUNCTION TESTS
PIF_VALUE: 45
PIF_VALUE: 2
PIF_VALUE: 46
PIF_VALUE: 40
PIF_VALUE: 24
PIF_VALUE: 44
PIF_VALUE: 46
PIF_VALUE: 43
PIF_VALUE: 41
PIF_VALUE: 18
PIF_VALUE: 25
PIF_VALUE: 24
PIF_VALUE: 44
PIF_VALUE: 1
PIF_VALUE: 37
PIF_VALUE: 40
PIF_VALUE: 42
PIF_VALUE: 41
PIF_VALUE: 5
PIF_VALUE: 24
PIF_VALUE: 46
PIF_VALUE: 23
PIF_VALUE: 43
PIF_VALUE: 43
PIF_VALUE: 51
PIF_VALUE: 27
PIF_VALUE: 24
PIF_VALUE: 45
PIF_VALUE: 25
PIF_VALUE: 24
PIF_VALUE: 3
PIF_VALUE: 41
PIF_VALUE: 4
PIF_VALUE: 38
PIF_VALUE: 42
PIF_VALUE: 10
PIF_VALUE: 19
PIF_VALUE: 24
PIF_VALUE: 56
PIF_VALUE: 44
PIF_VALUE: 31
PIF_VALUE: 24
PIF_VALUE: 42
PIF_VALUE: 56
PIF_VALUE: 26
PIF_VALUE: 21
PIF_VALUE: 56
PIF_VALUE: 20

## 2021-08-09 ASSESSMENT — PAIN SCALES - GENERAL
PAINLEVEL_OUTOF10: 0

## 2021-08-09 ASSESSMENT — ENCOUNTER SYMPTOMS: SHORTNESS OF BREATH: 1

## 2021-08-09 ASSESSMENT — LIFESTYLE VARIABLES: SMOKING_STATUS: 1

## 2021-08-09 NOTE — DISCHARGE SUMMARY
Hospital Medicine Discharge Summary    Patient ID: [place]      Patient's PCP: No primary care provider on file. Admit Date: 8/4/2021     Discharge Date:   8/9/2021     Admitting Physician: Kyree Bucio MD     Discharge Physician: Arthur Adams MD     Discharge Diagnoses: Active Hospital Problems    Diagnosis Date Noted    COPD (chronic obstructive pulmonary disease) ([facility] [J44.9] 08/04/2021    ETOH abuse [F10.10] 08/04/2021    Hematemesis [K92.0] 08/04/2021    Pneumonia [J18.9] 08/04/2021     Aspiration pneumonia  Acute respiratory failure with hypoxia-secondary to above  Alcohol dependence with acute intoxication  Hematemesis  Duodenitis/esophagitis/mild gastritis  PAO, resolved  Lactic acidosis  COPD without acute exacerbation  Essential hypertension, uncontrolled  Nicotine dependence  Cognitive deficit, probable early dementia  Noncompliance    The patient was seen and examined on day of discharge and this discharge summary is in conjunction with any daily progress note from day of discharge. Hospital Course: This is 75-year-old male with a past medical history of COPD,Hypertension, alcohol abuse who presented to the emergency department with hematemesis after he had been binge drinking. CT abdomen/pelvis revealed distal esophageal wall thickening suggestive of esophagitis. He was admitted for acute respiratory failure likely secondary to suspected aspiration pneumonia; started on IV antibiotics. Underwent EGD that showed esophagitis, mild gastritis, duodenitis. Patient was evaluated by pulmonologist; antibiotics was continued for suspected aspiration pneumonia, started on short course of steroid. He underwent bronchoscopy with EBUS which showed atelectasis of right middle lobe but no endobronchial lesion. Patient was discharged home in good condition to follow-up with PCP, pulmonologist for routine outpatient management.         Exam:     /86   Pulse 103   Temp 98.2 °F (36.8 °C) (Temporal)   Resp 17   Ht 5' 6\" (1.676 m)   Wt 140 lb (63.5 kg)   SpO2 95%   BMI 22.60 kg/m²     General appearance: Sitting comfortably in bed. No apparent distress. Respiratory: Clear to auscultation bilaterally. Cardiovascular: Normal S1/S2. Regular rhythm and rate. Abdomen: Soft, non-tender, non-distended with normal bowel sounds. Musculoskeletal: No clubbing, cyanosis or edema bilaterally. Skin: Skin color, texture, turgor normal.  No rashes or lesions. Neurologic:  No focal deficit. Psychiatric: Alert and oriented, thought content appropriate, normal insight  Peripheral Pulses: +2 palpable, equal bilaterally       Consults:     IP CONSULT TO INTERNAL MEDICINE  IP CONSULT TO GENERAL SURGERY  IP CONSULT TO SOCIAL WORK  IP CONSULT TO PULMONOLOGY      Disposition:  Home     Condition at Discharge: Stable    Discharge Instructions/Follow-up: Pulmonologist, PCP    Code Status:  Full Code     Activity: activity as tolerated    Diet: cardiac diet    Labs:  For convenience and continuity at follow-up the following most recent labs are provided:      CBC:    Lab Results   Component Value Date    WBC 9.5 08/08/2021    HGB 12.3 08/08/2021    HCT 40.9 08/08/2021     08/08/2021       Renal:    Lab Results   Component Value Date     08/09/2021    K 3.1 08/09/2021     08/09/2021    CO2 27 08/09/2021    BUN 16 08/09/2021    CREATININE 0.9 08/09/2021    CALCIUM 9.1 08/09/2021    PHOS 6.8 08/04/2021       Discharge Medications:     Current Discharge Medication List      START taking these medications    Details   predniSONE (DELTASONE) 20 MG tablet Take 1 tablet by mouth daily for 2 days  Qty: 2 tablet, Refills: 0      amoxicillin-clavulanate (AUGMENTIN) 875-125 MG per tablet Take 1 tablet by mouth every 12 hours for 3 days  Qty: 6 tablet, Refills: 0      thiamine (B-1) 100 MG/ML injection Infuse 1 mL intravenously daily  Qty: 1 Syringe, Refills: 0      Multiple Vitamin (MULTIVITAMIN) TABS tablet Take 1 tablet by mouth daily  Qty: 30 tablet, Refills: 1      lisinopril (PRINIVIL;ZESTRIL) 5 MG tablet Take 1 tablet by mouth daily  Qty: 30 tablet, Refills: 3      folic acid (FOLVITE) 1 MG tablet Take 1 tablet by mouth daily  Qty: 30 tablet, Refills: 0      pantoprazole (PROTONIX) 40 MG tablet Take 1 tablet by mouth 2 times daily (before meals)  Qty: 180 tablet, Refills: 1      sucralfate (CARAFATE) 1 GM tablet Take 1 tablet by mouth 4 times daily  Qty: 120 tablet, Refills: 1         CONTINUE these medications which have CHANGED    Details   albuterol sulfate HFA (VENTOLIN HFA) 108 (90 Base) MCG/ACT inhaler Inhale 2 puffs into the lungs 4 times daily as needed for Wheezing or Shortness of Breath  Qty: 1 Inhaler, Refills: 3      amLODIPine (NORVASC) 10 MG tablet Take 1 tablet by mouth daily  Qty: 30 tablet, Refills: 3         STOP taking these medications       atorvastatin (LIPITOR) 20 MG tablet Comments:   Reason for Stopping:         mometasone-formoterol (DULERA) 100-5 MCG/ACT inhaler Comments:   Reason for Stopping:         nicotine (NICODERM CQ) 21 MG/24HR Comments:   Reason for Stopping:         Misc. Devices MISC Comments:   Reason for Stopping:         Umeclidinium Bromide (INCRUSE ELLIPTA) 62.5 MCG/INH AEPB Comments:   Reason for Stopping:               Time Spent on discharge is more than 30 minutes in the examination, evaluation, counseling and review of medications and discharge plan. Signed:    Zach Goldberg MD   8/9/2021      Thank you No primary care provider on file. for the opportunity to be involved in this patient's care. If you have any questions or concerns please feel free to contact me at [phone].

## 2021-08-09 NOTE — PROGRESS NOTES
CLINICAL PHARMACY NOTE: MEDS TO BEDS    Total # of Prescriptions Filled: 3   The following medications were delivered to the patient:  · amox-pot clav 875-125mg  · Albuterol sulf hfa  · Prednisone 20mg    Additional Documentation:

## 2021-08-09 NOTE — PLAN OF CARE
Problem: Falls - Risk of:  Goal: Will remain free from falls  Description: Will remain free from falls  Outcome: Met This Shift     Problem:  Activity:  Goal: Fatigue will decrease  Description: Fatigue will decrease  Outcome: Met This Shift     Problem: Coping:  Goal: Level of anxiety will decrease  Description: Level of anxiety will decrease  Outcome: Met This Shift     Problem: Respiratory:  Goal: Ability to maintain a clear airway will improve  Description: Ability to maintain a clear airway will improve  Outcome: Met This Shift     Problem: Coping:  Goal: Level of anxiety will decrease  Description: Level of anxiety will decrease  Outcome: Met This Shift     Problem: Respiratory:  Goal: Ability to maintain adequate ventilation will improve  Description: Ability to maintain adequate ventilation will improve  Outcome: Met This Shift

## 2021-08-09 NOTE — PROGRESS NOTES
CLINICAL PHARMACY NOTE: MEDS TO BEDS    Total # of Prescriptions Filled: 3   The following medications were delivered to the patient:  · Lisinopril 5mg  · Amlodipine 44OT  · Folic acid 1mg    Additional Documentation:

## 2021-08-09 NOTE — ANESTHESIA PRE PROCEDURE
Department of Anesthesiology  Preprocedure Note       Name:  Pastor Gamino   Age:  65 y.o.  :  1950                                          MRN:  [id number]         Date:  2021      Surgeon: Valente Trevino):  Jair Starkey DO    Procedure: Procedure(s):  BRONCHOSCOPY ENDOBRONCHIAL ULTRASOUND  BRONCHOSCOPY    Medications prior to admission:   Prior to Admission medications    Medication Sig Start Date End Date Taking? Authorizing Provider   predniSONE (DELTASONE) 20 MG tablet Take 1 tablet by mouth daily for 2 days 8/10/21 8/12/21  SEEMA Lynch CNP   amoxicillin-clavulanate (AUGMENTIN) 875-125 MG per tablet Take 1 tablet by mouth every 12 hours for 3 days 8/10/21 8/13/21  SEEMA Lynch CNP   albuterol sulfate HFA (VENTOLIN HFA) 108 (90 Base) MCG/ACT inhaler Inhale 2 puffs into the lungs 4 times daily as needed for Wheezing or Shortness of Breath 21   SEEMA Lynch CNP   pantoprazole (PROTONIX) 40 MG tablet Take 1 tablet by mouth 2 times daily (before meals) 21   Ayesha Melendez MD   sucralfate (CARAFATE) 1 GM tablet Take 1 tablet by mouth 4 times daily 21   Miguel Meyer MD       Current medications:    No current facility-administered medications for this visit.      Current Outpatient Medications   Medication Sig Dispense Refill    [START ON 8/10/2021] predniSONE (DELTASONE) 20 MG tablet Take 1 tablet by mouth daily for 2 days 2 tablet 0    [START ON 8/10/2021] amoxicillin-clavulanate (AUGMENTIN) 875-125 MG per tablet Take 1 tablet by mouth every 12 hours for 3 days 6 tablet 0    albuterol sulfate HFA (VENTOLIN HFA) 108 (90 Base) MCG/ACT inhaler Inhale 2 puffs into the lungs 4 times daily as needed for Wheezing or Shortness of Breath 1 Inhaler 3    pantoprazole (PROTONIX) 40 MG tablet Take 1 tablet by mouth 2 times daily (before meals) 180 tablet 1    sucralfate (CARAFATE) 1 GM tablet Take 1 tablet by mouth 4 times daily 120 tablet 1     Facility-Administered Medications Ordered in Other Visits   Medication Dose Route Frequency Provider Last Rate Last Admin    propofol injection   Intravenous PRN Mancel Platts, APRN - CRNA   50 mcg/kg/min at 08/09/21 1211    fentaNYL (SUBLIMAZE) injection   Intravenous PRN Mancel Platts, APRN - CRNA   100 mcg at 08/09/21 1204    lidocaine (cardiac) (XYLOCAINE) injection   Intravenous PRN Mancel Platts, APRN - CRNA   60 mg at 08/09/21 1204    rocuronium (ZEMURON) injection   Intravenous PRN Mancel Platts, APRN - CRNA   25 mg at 08/09/21 1204    0.9 % sodium chloride infusion   Intravenous Continuous PRN Mancel Platts, APRN - CRNA   New Bag at 08/09/21 1202    ondansetron (ZOFRAN) injection   Intravenous PRN Mancel Platts, APRN - CRNA   4 mg at 08/09/21 1213    dexamethasone (PF) (DECADRON) injection   Intravenous PRN Mancel Platts, APRN - CRNA   10 mg at 08/09/21 1213    potassium chloride (KLOR-CON M) extended release tablet 40 mEq  40 mEq Oral PRN Maurice Mathews DO   40 mEq at 08/08/21 1036    Or    potassium bicarb-citric acid (EFFER-K) effervescent tablet 40 mEq  40 mEq Oral PRN Maurice Mathews DO        Or    potassium chloride 10 mEq/100 mL IVPB (Peripheral Line)  10 mEq Intravenous PRN Maurice DO Reid        amoxicillin-clavulanate (AUGMENTIN) 875-125 MG per tablet 1 tablet  1 tablet Oral 2 times per day Maurice Mathews DO   1 tablet at 08/09/21 0830    amLODIPine (NORVASC) tablet 10 mg  10 mg Oral Daily Maurice Mathews DO   10 mg at 08/09/21 0830    sucralfate (CARAFATE) tablet 1 g  1 g Oral 4 times per day Rosemarie Fuentes MD   1 g at 08/08/21 2335    predniSONE (DELTASONE) tablet 20 mg  20 mg Oral Daily SEEMA Holly - CNP   20 mg at 08/09/21 0830    budesonide (PULMICORT) nebulizer suspension 500 mcg  0.5 mg Nebulization BID Dominique Camejo MD   500 mcg at 08/09/21 0849    And    Arformoterol Tartrate (BROVANA) nebulizer solution 15 mcg  15 mcg Nebulization BID Dominique Camejo MD   15 mcg at 08/09/21 0849    albuterol (PROVENTIL) nebulizer solution 2.5 mg  2.5 mg Nebulization Q6H PRN Aleksandra Herbert MD        enoxaparin (LOVENOX) injection 40 mg  40 mg Subcutaneous Daily Aleksandra Hrebert MD   40 mg at 08/09/21 0830    hydrALAZINE (APRESOLINE) injection 10 mg  10 mg Intravenous Q6H PRN Marta Lerner MD   10 mg at 08/09/21 0826    labetalol (NORMODYNE;TRANDATE) injection 10 mg  10 mg Intravenous Q4H PRN Marta Lerner MD   10 mg at 08/05/21 2217    lisinopril (PRINIVIL;ZESTRIL) tablet 5 mg  5 mg Oral Daily Marta Lerner MD   5 mg at 35/07/43 8785    folic acid injection 1 mg  1 mg Intravenous Daily Aleksandra Herbert MD   1 mg at 08/09/21 0840    atorvastatin (LIPITOR) tablet 20 mg  20 mg Oral Daily Aleksandra Herbert MD   20 mg at 08/09/21 0830    sodium chloride flush 0.9 % injection 5-40 mL  5-40 mL Intravenous 2 times per day Aleksandra Herbert MD   10 mL at 08/09/21 0841    sodium chloride flush 0.9 % injection 5-40 mL  5-40 mL Intravenous PRN Aleksandra Herbert MD   10 mL at 08/06/21 1533    0.9 % sodium chloride infusion  25 mL Intravenous PRN Aleksandra Herbert MD        ondansetron (ZOFRAN-ODT) disintegrating tablet 4 mg  4 mg Oral Q8H PRN Aleksandra Herbert MD        Or    ondansetron [facility]) injection 4 mg  4 mg Intravenous Q6H PRN Aleksandra Herbert MD   4 mg at 08/05/21 2157    acetaminophen (TYLENOL) tablet 650 mg  650 mg Oral Q6H PRN Aleksandra Herbert MD   650 mg at 08/05/21 2157    Or    acetaminophen (TYLENOL) suppository 650 mg  650 mg Rectal Q6H PRN Aleksandra Herbert MD   650 mg at 08/04/21 0946    pantoprazole (PROTONIX) injection 40 mg  40 mg Intravenous Q12H Aleksandra Herbert MD   40 mg at 08/09/21 4825    And    sodium chloride (PF) 0.9 % injection 10 mL  10 mL Intravenous Q12H Aleksandra Herbert MD   10 mL at 08/09/21 0651    LORazepam (ATIVAN) tablet 1 mg  1 mg Oral Q1H PRN Aleksandra Herbert MD        Or    LORazepam (ATIVAN) injection 1 mg  1 mg Intravenous Q1H PRN Aleksandra Herbert MD        Or    LORazepam (ATIVAN) tablet 2 mg  2 mg Oral Q1H PRN Ignacio Rod MD        Or    LORazepam (ATIVAN) injection 2 mg  2 mg Intravenous Q1H PRN Ignacio Rod MD   2 mg at 08/08/21 1159    Or    LORazepam (ATIVAN) tablet 3 mg  3 mg Oral Q1H PRN Ignacio Rod MD        Or    LORazepam (ATIVAN) injection 3 mg  3 mg Intravenous Q1H PRN Ignacio Rod MD        Or    LORazepam (ATIVAN) tablet 4 mg  4 mg Oral Q1H PRN Ignacio Rod MD        Or    LORazepam (ATIVAN) injection 4 mg  4 mg Intravenous Q1H PRN Ignacio Rod MD        multivitamin 1 tablet  1 tablet Oral Daily Ignacio Rod MD   1 tablet at 08/09/21 0830    thiamine (B-1) injection 100 mg  100 mg Intravenous Daily Ignacio Rod MD   100 mg at 08/09/21 0831       Allergies:  No Known Allergies    Problem List:    Patient Active Problem List   Diagnosis Code    Dyspnea R06.00    COPD exacerbation ([facility] J44.1    Essential hypertension I10    Respiratory distress R06.03    Acute respiratory failure with hypoxia (HCC) J96.01    Gallstones K80.20    COPD (chronic obstructive pulmonary disease) ([facility] J44.9    ETOH abuse F10.10    Hematemesis K92.0    Pneumonia J18.9       Past Medical History:        Diagnosis Date    Arthritis     Hypertension     Tobacco abuse        Past Surgical History:        Procedure Laterality Date    UPPER GASTROINTESTINAL ENDOSCOPY N/A 8/5/2021    EGD BIOPSY performed by Ignacio Rod MD at [facility] History:    Social History     Tobacco Use    Smoking status: Current Every Day Smoker     Packs/day: 0.25     Years: 50.00     Pack years: 12.50     Types: Cigarettes    Smokeless tobacco: Never Used   Substance Use Topics    Alcohol use: Yes     Comment: social                                Ready to quit: Not Answered  Counseling given: Not Answered      Vital Signs (Current): There were no vitals filed for this visit.                                            BP Readings from Last 3 Encounters:   08/09/21 (!) 155/95 08/09/21 96/79   08/05/21 (!) 173/99       NPO Status:                                                                                 BMI:   Wt Readings from Last 3 Encounters:   08/04/21 140 lb (63.5 kg)   07/02/17 140 lb 3.4 oz (63.6 kg)   06/20/17 141 lb 8 oz (64.2 kg)     There is no height or weight on file to calculate BMI.    CBC:   Lab Results   Component Value Date    WBC 9.5 08/08/2021    RBC 5.37 08/08/2021    HGB 12.3 08/08/2021    HCT 40.9 08/08/2021    MCV 76.2 08/08/2021    RDW 13.4 08/08/2021     08/08/2021       CMP:   Lab Results   Component Value Date     08/09/2021    K 3.1 08/09/2021     08/09/2021    CO2 27 08/09/2021    BUN 16 08/09/2021    CREATININE 0.9 08/09/2021    GFRAA >60 08/09/2021    LABGLOM >60 08/09/2021    GLUCOSE 93 08/09/2021    PROT 5.9 08/08/2021    CALCIUM 9.1 08/09/2021    BILITOT 0.8 08/08/2021    ALKPHOS 64 08/08/2021    AST 12 08/08/2021    ALT 10 08/08/2021       POC Tests: No results for input(s): POCGLU, POCNA, POCK, POCCL, POCBUN, POCHEMO, POCHCT in the last 72 hours. Coags:   Lab Results   Component Value Date    PROTIME 12.1 07/02/2017    INR 1.1 07/02/2017    APTT 32.6 06/19/2017       HCG (If Applicable): No results found for: PREGTESTUR, PREGSERUM, HCG, HCGQUANT     ABGs: No results found for: PHART, PO2ART, JRN9PUY, ZZR3EFR, BEART, J3XQNHLT     Type & Screen (If Applicable):  No results found for: LABABO, LABRH    Drug/Infectious Status (If Applicable):  No results found for: HIV, HEPCAB    COVID-19 Screening (If Applicable): No results found for: COVID19    CT OF THE CHEST WITH AND WITHOUT CONTRAST 8/6/2021 8:27 pm       TECHNIQUE:   CT of the chest was performed without and with the administration of   intravenous contrast. Multiplanar reformatted images are provided for review.    Dose modulation, iterative reconstruction, and/or weight based adjustment of   the mA/kV was utilized to reduce the radiation dose to as low as reasonably achievable.       COMPARISON:   CT chest July 30, 2021       HISTORY:   ORDERING SYSTEM PROVIDED HISTORY: Right hilar mass/ atelectasis   TECHNOLOGIST PROVIDED HISTORY:   Reason for exam:->Right hilar mass/ atelectasis   What reading provider will be dictating this exam?->CRC       FINDINGS:   Mediastinum: Prominent subcarinal lymph node measures up to 1.2 cm partial   calcifications.  The pulmonary trunk is normal in size       Lungs/pleura:   Pleural thickening bronchiectasis seen in the middle lobe. Recommend of mild-to-moderate emphysema.       Upper Abdomen: Gallstones layering within the gallbladder.  No   pericholecystic fat stranding.       Soft Tissues/Bones: No aggressive osseous lesions.           Impression   Partially calcified right hilar and subcarinal lymph nodes.  Findings may be   secondary to response to therapy.       Bronchiectasis in the middle lobe may be secondary to therapy.       No suspicious pulmonary lesions identified. ONE XRAY VIEW OF THE CHEST       8/4/2021 9:14 am       COMPARISON:   August 4, 2021 chest radiograph       HISTORY:   ORDERING SYSTEM PROVIDED HISTORY: worsening hypoxia   TECHNOLOGIST PROVIDED HISTORY:   Reason for exam:->worsening hypoxia   What reading provider will be dictating this exam?->CRC       FINDINGS:   Trachea is midline.  Cardiomediastinal silhouette stable in size.  Interval   development of linear atelectasis of the right lower lobe and left midlung. No pneumothorax.  No focal consolidations.  No pleural effusions.           Impression   Atelectasis in the bilateral mid to lower lungs.            EKG Narrative & Impression    Sinus tachycardia with occasional premature ventricular complexes  Possible Left atrial enlargement  Left ventricular hypertrophy  Nonspecific ST abnormality  Abnormal ECG  When compared with ECG of 02-JUL-2017 19:11,  Significant changes have occurred  Confirmed by [employer] ([phone]) on 8/4/2021 10:37:58 AM Anesthesia Evaluation  Patient summary reviewed and Nursing notes reviewed no history of anesthetic complications:   Airway: Mallampati: III  TM distance: >3 FB   Neck ROM: full  Mouth opening: > = 3 FB Dental:          Pulmonary:   (+) pneumonia:  COPD:  shortness of breath:  rhonchi,  current smoker (0.25 PPD. )                          ROS comment: -HX of respiratory distress/respiratory failure   Cardiovascular:    (+) hypertension:,       ECG reviewed  Rhythm: regular  Rate: normal                 ROS comment: EKG: Sinus Tachycardia 102, with occasional premature ventricular contractions, possible left atrial abnormality, left ventricular hypertrophy, NS St & T changes. .       Neuro/Psych:   (+) psychiatric history:             ROS comment: -ETOH use GI/Hepatic/Renal: Neg GI/Hepatic/Renal ROS            Endo/Other:    (+) blood dyscrasia: anemia, arthritis:., .                 Abdominal:             Vascular: Other Findings:               Anesthesia Plan      general     ASA 3       Induction: intravenous. Anesthetic plan and risks discussed with patient. Use of blood products discussed with patient whom. Plan discussed with CRNA and attending.     Attending anesthesiologist reviewed and agrees with Olaf Guerrero MD   8/9/2021

## 2021-08-09 NOTE — PROGRESS NOTES
ondansetron, acetaminophen **OR** acetaminophen, LORazepam **OR** LORazepam **OR** LORazepam **OR** LORazepam **OR** LORazepam **OR** LORazepam **OR** LORazepam **OR** LORazepam      Intake/Output Summary (Last 24 hours) at 8/9/2021 1506  Last data filed at 8/9/2021 1300  Gross per 24 hour   Intake 700 ml   Output 0 ml   Net 700 ml       Exam:    /86   Pulse 103   Temp 98.2 °F (36.8 °C) (Temporal)   Resp 17   Ht 5' 6\" (1.676 m)   Wt 140 lb (63.5 kg)   SpO2 95%   BMI 22.60 kg/m²     General appearance: Sitting comfortably in bed. No apparent distress. Respiratory: Clear to auscultation bilaterally. Cardiovascular: Normal S1/S2. Regular rhythm and rate. Abdomen: Soft, non-tender, non-distended with normal bowel sounds. Musculoskeletal: No clubbing, cyanosis or edema bilaterally. Skin: Skin color, texture, turgor normal.  No rashes or lesions. Neurologic:  No focal deficit. Psychiatric: Alert and oriented, thought content appropriate, normal insight  Peripheral Pulses: +2 palpable, equal bilaterally       Labs:   Recent Labs     08/07/21  0747 08/07/21  1358 08/08/21  0826   WBC  --   --  9.5   HGB 12.4* 13.0 12.3*   HCT 42.2 40.8 40.9   PLT  --   --  206     Recent Labs     08/08/21  0826 08/09/21  0644    139   K 3.3* 3.1*    102   CO2 27 27   BUN 15 16   CREATININE 0.8 0.9   CALCIUM 8.9 9.1     Recent Labs     08/08/21  0826   AST 12   ALT 10   BILITOT 0.8   ALKPHOS 64     No results for input(s): INR in the last 72 hours. No results for input(s): [place] in the last 72 hours. Radiology:  XR CHEST PORTABLE   Final Result   Right mid lung opacity consistent with atelectasis and/or pneumonia. CT CHEST W WO CONTRAST   Final Result   Partially calcified right hilar and subcarinal lymph nodes. Findings may be   secondary to response to therapy. Bronchiectasis in the middle lobe may be secondary to therapy. No suspicious pulmonary lesions identified. XR CHEST PORTABLE   Final Result   Atelectasis in the bilateral mid to lower lungs. CT ABDOMEN PELVIS W IV CONTRAST Additional Contrast? None   Final Result   1. There is wall thickening of visualized distal esophagus which may   represent esophagitis, less likely esophageal mass. Suggest correlation with   endoscopy. 2. Cholelithiasis. 3. Punctate non-obstructing left intrarenal calculus. 4. Tiny low-density liver lesions are likely small cysts although too small   to definitively characterize. 5. Diverticulosis. No acute diverticulitis seen. 6. Normal appendix. 7. There is mild lymphadenopathy involving visualized roberto. XR CHEST PORTABLE   Final Result   Suspect right infrahilar infiltrate. Active Hospital Problems    Diagnosis Date Noted    COPD (chronic obstructive pulmonary disease) ([facility] [J44.9] 08/04/2021    ETOH abuse [F10.10] 08/04/2021    Hematemesis [K92.0] 08/04/2021    Pneumonia [J18.9] 08/04/2021       Assessment  Aspiration pneumonia  Acute respiratory failure with hypoxia-secondary to above  Alcohol dependence with acute intoxication  Hematemesis - likely secondary to below in the setting of  binge drinking  Duodenitis/esophagitis/mild gastritis  PAO, resolved  Lactic acidosis  COPD without acute exacerbation  Essential hypertension, uncontrolled  Nicotine dependence  Cognitive deficit, probable early dementia  Noncompliance    Plan:  Continue with prednisone  Scheduled and PRN bronchodilators  On Augmentin  Supplemental oxygen as needed to keep saturation above 92%  Bronchoscopy/EBUS scheduled for this morning      DVT Prophylaxis:   Diet: ADULT DIET;  Regular  Code Status: Full Code    PT/OT Eval Status: N/A    Dispo - home    Char Sosa MD 8/9/2021 3:06 PM

## 2021-08-09 NOTE — ANESTHESIA PRE PROCEDURE
Nebulization Q6H PRN Chelly Otto MD        enoxaparin (LOVENOX) injection 40 mg  40 mg Subcutaneous Daily Chelly Otto MD   40 mg at 08/08/21 0902    hydrALAZINE (APRESOLINE) injection 10 mg  10 mg Intravenous Q6H PRN Ramos Goncalves MD   10 mg at 08/08/21 0903    labetalol (NORMODYNE;TRANDATE) injection 10 mg  10 mg Intravenous Q4H PRN Ramos Goncalves MD   10 mg at 08/05/21 2217    lisinopril (PRINIVIL;ZESTRIL) tablet 5 mg  5 mg Oral Daily Ramos Goncalves MD   5 mg at 53/06/81 1646    folic acid injection 1 mg  1 mg Intravenous Daily Chelly Otto MD   1 mg at 08/08/21 1036    atorvastatin (LIPITOR) tablet 20 mg  20 mg Oral Daily Chelly Otto MD   20 mg at 08/08/21 6168    sodium chloride flush 0.9 % injection 5-40 mL  5-40 mL Intravenous 2 times per day Chelly Otto MD   10 mL at 08/08/21 2152    sodium chloride flush 0.9 % injection 5-40 mL  5-40 mL Intravenous PRN Chelly Otto MD   10 mL at 08/06/21 1533    0.9 % sodium chloride infusion  25 mL Intravenous PRN Chelly Otto MD        ondansetron (ZOFRAN-ODT) disintegrating tablet 4 mg  4 mg Oral Q8H PRN Chelly Otto MD        Or    ondansetron [facility] injection 4 mg  4 mg Intravenous Q6H PRN Chelly Otto MD   4 mg at 08/05/21 2157    acetaminophen (TYLENOL) tablet 650 mg  650 mg Oral Q6H PRN Chelly Otto MD   650 mg at 08/05/21 2157    Or    acetaminophen (TYLENOL) suppository 650 mg  650 mg Rectal Q6H PRN Chelly Otto MD   650 mg at 08/04/21 0946    pantoprazole (PROTONIX) injection 40 mg  40 mg Intravenous Q12H Chelly Otto MD   40 mg at 08/09/21 1402    And    sodium chloride (PF) 0.9 % injection 10 mL  10 mL Intravenous Q12H Chelly Otto MD   10 mL at 08/09/21 0651    LORazepam (ATIVAN) tablet 1 mg  1 mg Oral Q1H PRN Chelly Otto MD        Or    LORazepam (ATIVAN) injection 1 mg  1 mg Intravenous Q1H PRN Chelly Otto MD        Or    LORazepam (ATIVAN) tablet 2 mg  2 mg Oral Q1H PRN Chelly Otto MD        Or    LORazepam Temporal Temporal   SpO2: 95%  100% 96%   Weight:       Height:                                                  BP Readings from Last 3 Encounters:   08/09/21 (!) 160/102   08/05/21 (!) 173/99   07/04/17 (!) 157/87       NPO Status:                                                                                 BMI:   Wt Readings from Last 3 Encounters:   08/04/21 140 lb (63.5 kg)   07/02/17 140 lb 3.4 oz (63.6 kg)   06/20/17 141 lb 8 oz (64.2 kg)     Body mass index is 22.6 kg/m². CBC:   Lab Results   Component Value Date    WBC 9.5 08/08/2021    RBC 5.37 08/08/2021    HGB 12.3 08/08/2021    HCT 40.9 08/08/2021    MCV 76.2 08/08/2021    RDW 13.4 08/08/2021     08/08/2021       CMP:   Lab Results   Component Value Date     08/08/2021    K 3.3 08/08/2021    K 4.0 08/05/2021     08/08/2021    CO2 27 08/08/2021    BUN 15 08/08/2021    CREATININE 0.8 08/08/2021    GFRAA >60 08/08/2021    LABGLOM >60 08/08/2021    GLUCOSE 93 08/08/2021    PROT 5.9 08/08/2021    CALCIUM 8.9 08/08/2021    BILITOT 0.8 08/08/2021    ALKPHOS 64 08/08/2021    AST 12 08/08/2021    ALT 10 08/08/2021       POC Tests: No results for input(s): POCGLU, POCNA, POCK, POCCL, POCBUN, POCHEMO, POCHCT in the last 72 hours.     Coags:   Lab Results   Component Value Date    PROTIME 12.1 07/02/2017    INR 1.1 07/02/2017    APTT 32.6 06/19/2017       HCG (If Applicable): No results found for: PREGTESTUR, PREGSERUM, HCG, HCGQUANT     ABGs: No results found for: PHART, PO2ART, TUL1JXY, WOQ1ARX, BEART, T6DAADTW     Type & Screen (If Applicable):  No results found for: LABABO, LABRH    Drug/Infectious Status (If Applicable):  No results found for: HIV, HEPCAB    COVID-19 Screening (If Applicable): No results found for: COVID19    CT OF THE CHEST WITH AND WITHOUT CONTRAST 8/6/2021 8:27 pm       TECHNIQUE:   CT of the chest was performed without and with the administration of   intravenous contrast. Multiplanar reformatted images are provided for review. Dose modulation, iterative reconstruction, and/or weight based adjustment of   the mA/kV was utilized to reduce the radiation dose to as low as reasonably   achievable.       COMPARISON:   CT chest July 30, 2021       HISTORY:   ORDERING SYSTEM PROVIDED HISTORY: Right hilar mass/ atelectasis   TECHNOLOGIST PROVIDED HISTORY:   Reason for exam:->Right hilar mass/ atelectasis   What reading provider will be dictating this exam?->CRC       FINDINGS:   Mediastinum: Prominent subcarinal lymph node measures up to 1.2 cm partial   calcifications.  The pulmonary trunk is normal in size       Lungs/pleura:   Pleural thickening bronchiectasis seen in the middle lobe. Recommend of mild-to-moderate emphysema.       Upper Abdomen: Gallstones layering within the gallbladder.  No   pericholecystic fat stranding.       Soft Tissues/Bones: No aggressive osseous lesions.           Impression   Partially calcified right hilar and subcarinal lymph nodes.  Findings may be   secondary to response to therapy.       Bronchiectasis in the middle lobe may be secondary to therapy.       No suspicious pulmonary lesions identified. ONE XRAY VIEW OF THE CHEST       8/4/2021 9:14 am       COMPARISON:   August 4, 2021 chest radiograph       HISTORY:   ORDERING SYSTEM PROVIDED HISTORY: worsening hypoxia   TECHNOLOGIST PROVIDED HISTORY:   Reason for exam:->worsening hypoxia   What reading provider will be dictating this exam?->CRC       FINDINGS:   Trachea is midline.  Cardiomediastinal silhouette stable in size.  Interval   development of linear atelectasis of the right lower lobe and left midlung. No pneumothorax.  No focal consolidations.  No pleural effusions.           Impression   Atelectasis in the bilateral mid to lower lungs.            EKG Narrative & Impression    Sinus tachycardia with occasional premature ventricular complexes  Possible Left atrial enlargement  Left ventricular hypertrophy  Nonspecific ST abnormality  Abnormal ECG  When compared with ECG of 02-JUL-2017 19:11,  Significant changes have occurred  Confirmed by Gama Damon ([id number]) on 8/4/2021 10:37:58 AM                             Anesthesia Evaluation  Patient summary reviewed and Nursing notes reviewed no history of anesthetic complications:   Airway: Mallampati: III  TM distance: >3 FB   Neck ROM: full  Mouth opening: > = 3 FB Dental:          Pulmonary:   (+) pneumonia:  COPD:  shortness of breath:  rhonchi,                            ROS comment: -HX of respiratory distress/respiratory failure   Cardiovascular:    (+) hypertension:,         Rhythm: regular  Rate: normal                    Neuro/Psych:   (+) psychiatric history:             ROS comment: -ETOH use GI/Hepatic/Renal: Neg GI/Hepatic/Renal ROS            Endo/Other:    (+) blood dyscrasia: anemia, arthritis:., .                 Abdominal:             Vascular: Other Findings:           Anesthesia Plan      general     ASA 3       Induction: intravenous. Anesthetic plan and risks discussed with patient. Use of blood products discussed with patient whom. Plan discussed with CRNA and attending.               Teresita Bell RN   8/9/2021

## 2021-08-09 NOTE — PROGRESS NOTES
Michael Handy M.D.,[facility]  Katherine Still D.O., F.A.C.O.I., Leilani Knight M.D. Radha Mg M.D. Jorge Luis Negrete D.O. Daily Pulmonary Progress Note    Patient: Erik Martinez 65 y.o. male MRN: [id number]     Date of Service: 8/9/2021      Synopsis     We are following patient for abnormal CT scan    \"CC\"     Code status: FULL      Subjective      Patient was seen and examined. Lying in bed on Room air, in NAD. He is for a bronchoscopy today and is wanting to go home afterwards. RN made aware. Review of Systems:  Constitutional: Denies fever, weight loss, night sweats, and fatigue  Skin: Denies pigmentation, dark lesions, and rashes   HEENT: Denies hearing loss, tinnitus, ear drainage, epistaxis, sore throat, and hoarseness. Cardiovascular: Denies palpitations, chest pain, and chest pressure. Respiratory: has chronic cough, dyspnea at rest, hemoptysis, apnea, and choking.   Gastrointestinal: Denies nausea, vomiting, poor appetite, diarrhea, heartburn or reflux  Genitourinary: Denies dysuria, frequency, urgency or hematuria  Musculoskeletal: Denies myalgias, muscle weakness, and bone pain  Neurological: Denies dizziness, vertigo, headache, and focal weakness  Psychological: Denies anxiety and depression  Endocrine: Denies heat intolerance and cold intolerance  Hematopoietic/Lymphatic: Denies bleeding problems and blood transfusions    24-hour events:  None     Objective   Vitals: BP (!) 155/95   Pulse 91   Temp 98.5 °F (36.9 °C) (Temporal)   Resp 16   Ht 5' 6\" (1.676 m)   Wt 140 lb (63.5 kg)   SpO2 95%   BMI 22.60 kg/m²     I/O:    Intake/Output Summary (Last 24 hours) at 8/9/2021 1209  Last data filed at 8/9/2021 0910  Gross per 24 hour   Intake 0 ml   Output 0 ml   Net 0 ml       Vent Information  Skin Assessment: Clean, dry, & intact  Equipment ID: [id number]  FiO2 : 40 %  SpO2: 95 %  I Time/ I Time %: 0.9 s  Mask Type: Full face mask  Mask Size: Large       IPAP: 12 cmH20  CPAP/EPAP: 8 cmH2O     CURRENT MEDS :  Scheduled Meds:   amoxicillin-clavulanate  1 tablet Oral 2 times per day    amLODIPine  10 mg Oral Daily    sucralfate  1 g Oral 4 times per day    predniSONE  20 mg Oral Daily    budesonide  0.5 mg Nebulization BID    And    Arformoterol Tartrate  15 mcg Nebulization BID    enoxaparin  40 mg Subcutaneous Daily    lisinopril  5 mg Oral Daily    folic acid  1 mg Intravenous Daily    atorvastatin  20 mg Oral Daily    sodium chloride flush  5-40 mL Intravenous 2 times per day    pantoprazole  40 mg Intravenous Q12H    And    sodium chloride (PF)  10 mL Intravenous Q12H    multivitamin  1 tablet Oral Daily    thiamine  100 mg Intravenous Daily       Physical Exam:  General Appearance: appears comfortable in no acute distress. HEENT: Normocephalic atraumatic without obvious abnormality   Neck: Lips, mucosa, and tongue normal.  Supple, symmetrical, trachea midline, no adenopathy;thyroid:  no enlargement/tenderness/nodules or JVD. Lung: Breath sounds crackles. Respirations unlabored. Symmetrical expansion. Heart: RRR, normal S1, S2. No MRG  Abdomen: Soft, NT, ND. BS present x 4 quadrants. No bruit or organomegaly. Extremities: Pedal pulses 2+ symmetric b/l. Extremities normal, no cyanosis, clubbing, or edema. Musculokeletal: No joint swelling, no muscle tenderness. ROM normal in all joints of extremities. Neurologic: Mental status: Alert and Oriented X3 . Pertinent/ New Labs and Imaging Studies     Imaging Personally Reviewed:  CXR 8/9  Trachea is midline. Cardiomediastinal silhouette stable in size. Interval   development of linear atelectasis of the right lower lobe and left midlung. No pneumothorax. No focal consolidations. No pleural effusions.                Narrative   EXAMINATION:   CT OF THE CHEST WITH AND WITHOUT CONTRAST 8/6/2021 8:27 pm       TECHNIQUE:   CT of the chest was performed without and with the administration of   intravenous contrast. Multiplanar reformatted images are provided for review. Dose modulation, iterative reconstruction, and/or weight based adjustment of   the mA/kV was utilized to reduce the radiation dose to as low as reasonably   achievable. COMPARISON:   CT chest July 30, 2021       HISTORY:   ORDERING SYSTEM PROVIDED HISTORY: Right hilar mass/ atelectasis   TECHNOLOGIST PROVIDED HISTORY:   Reason for exam:->Right hilar mass/ atelectasis   What reading provider will be dictating this exam?->CRC       FINDINGS:   Mediastinum: Prominent subcarinal lymph node measures up to 1.2 cm partial   calcifications. The pulmonary trunk is normal in size       Lungs/pleura:   Pleural thickening bronchiectasis seen in the middle lobe. Recommend of mild-to-moderate emphysema. Upper Abdomen: Gallstones layering within the gallbladder. No   pericholecystic fat stranding. Soft Tissues/Bones: No aggressive osseous lesions. Impression   Partially calcified right hilar and subcarinal lymph nodes. Findings may be   secondary to response to therapy. Bronchiectasis in the middle lobe may be secondary to therapy. No suspicious pulmonary lesions identified. ECHO none on file      Labs:  Lab Results   Component Value Date    WBC 9.5 08/08/2021    HGB 12.3 08/08/2021    HCT 40.9 08/08/2021    MCV 76.2 08/08/2021    MCH 22.9 08/08/2021    MCHC 30.1 08/08/2021    RDW 13.4 08/08/2021     08/08/2021    MPV 10.7 08/08/2021     Lab Results   Component Value Date     08/09/2021    K 3.1 08/09/2021     08/09/2021    CO2 27 08/09/2021    BUN 16 08/09/2021    CREATININE 0.9 08/09/2021    LABALBU 3.6 08/08/2021    CALCIUM 9.1 08/09/2021    GFRAA >60 08/09/2021    LABGLOM >60 08/09/2021     Lab Results   Component Value Date    PROTIME 12.1 07/02/2017    INR 1.1 07/02/2017     No results for input(s): PROBNP in the last 72 hours. No results for input(s): PROCAL in the last 72 hours.   This SmartLink has not been configured with any valid records. Micro:  8/4 blood cultures negative     Assessment:    Acute respiratory insufficiency with hypoxia-resolved  Aspiration pneumonia  Hilar adenopathy  Alcohol intoxication after binge drinking, hematemesis. Status post EGD with biopsies 8/5/2021 -mild gastritis and duodenitis small type I hiatal hernia, esophagitis  Ongoing nicotine dependence  Evidence of emphysema on CT imaging  COPD, not in acute exacerbation  Uncontrolled hypertension  Medical noncompliance      Plan:   Diagnostic bronchoscopy/EBUS today  Scheduled bronchodilators-Brovana and Pulmicort twice daily, albuterol as needed  Augmentin x5 days. Started 8/8  Continue prednisone 20 mg for 5 days. Started 8/7  Oxygen therapy as needed, on RA  DVT, GI prophylaxis  Recommend outpatient PFTs for staging of COPD  Follow up outpatient in 2 weeks for bronch results and PFTs. Message routed to office    Electronically signed by SEEMA Hotl CNP on 8/9/2021 at 12:09 PM      I personally saw, examined, and cared for the patient. Labs, medications, radiographs reviewed. I agree with history exam and plans detailed in NP note.       Rea Hope, DO

## 2021-08-09 NOTE — PROCEDURES
Bronchoscopy Procedure Note  Procedure Date: 8/9/21   Procedure:  Flexible bronchoscopy  Location: [place] endoscopy  Attending: Dr. Gayatri Rodriguez  Anesthesia: General, see record    Indications: Abnormal CT with right middle lobe atelectasis, hilar adenopathy, history of tobacco use    Findings:  1. No endobronchial lesion seen  2. Atelectasis of right middle lobe  3. Enlarged station 7, station 11 R lymph node              Procedures Performed:  1. Video bronchoscopy with airway survey  2. BAL of right middle lobe  3. Transbronchial biopsies of right middle lobe  4. EBUS survey with transbronchial needle aspiration using a 21-gauge needle of station 7 and station 11 R lymph node    Consent:  The risks, benefits, indications, potential complications and alternatives were explained to the patient and informed consent obtained on 8/9/21     Description of Procedure:  Bronchoscopy performed in [facility] endoscopy suite for abnormal CT with right middle lobe atelectasis hilar adenopathy and patient with tobacco use. Patient had general anesthesia see record. Patient had 8.0 endotracheal tube with Portex adapter attached. Timeout called prior to procedure and all present agreed this proper patient procedure bronchoscope introduced through endotracheal tube through Portex into trachea or main forrest was visualized. Airway survey performed RB 1-10 and LB 1-10. No endobronchial lesions seen. Right middle lobe appeared atelectatic, however scope was able to be introduced and passed in distal airways were visualized with no endobronchial lesions. BAL was performed of the right middle lobe. Transbronchial biopsy obtained of the right middle lobe. Standard scope exchanged for EBUS scope and survey was performed. There was mildly enlarged station 7 and 11 R lymph nodes. Transbronchial needle aspiration under EBUS with REYNALDO onsite. Biopsies were taken of station 7 and 11 R lymph node.   Final survey revealed no active bleeding. Bronchoscope removed from tracheobronchial tree and patient tolerated procedure well.       Fluoro time: None  Complications: None    Plan:  Pending tissue assessment and microbiology

## 2021-08-09 NOTE — PROGRESS NOTES
Date: 8/8/2021    Time: 10:03 PM    Patient Placed On BIPAP/CPAP/ Non-Invasive Ventilation? Yes    If no must comment. Facial area red/color change? No           If YES are Blister/Lesion present? No   If yes must notify nursing staff  BIPAP/CPAP skin barrier?   Yes    Skin barrier type:mepilexlite       Comments:        Jes Craig RCP

## 2021-08-10 LAB
APPEARANCE FLUID: CLEAR
BASO FLUID: 0 %
CELL COUNT FLUID TYPE: NORMAL
COLOR FLUID: COLORLESS
EOSINOPHIL FLUID: 0 %
GRAM STAIN ORDERABLE: NORMAL
LYMPHOCYTES, BODY FLUID: 16 %
MONOCYTE, FLUID: 79 %
NEUTROPHIL, FLUID: 5 %
NUCLEATED CELLS FLUID: 15 /UL
RBC FLUID: <2000 /UL

## 2021-08-10 NOTE — ANESTHESIA POSTPROCEDURE EVALUATION
Department of Anesthesiology  Postprocedure Note    Patient: Pastor Gamino  MRN: [id number]  YOB: 1950  Date of evaluation: 8/10/2021  Time:  6:53 AM     Procedure Summary     Date: 08/09/21 Room / Location: [place] / [facility]    Anesthesia Start: 2555 Anesthesia Stop: 3043    Procedures:       BRONCHOSCOPY ALVEOLAR LAVAGE (N/A )      BRONCHOSCOPY BIOPSY BRONCHUS (N/A )      BRONCHOSCOPY W/EBUS FNA (N/A ) Diagnosis: (?)    Surgeons: Jair Starkey DO Responsible Provider: Vito Chowdhury MD    Anesthesia Type: general ASA Status: 3          Anesthesia Type: general    Jhon Phase I: Jhon Score: 10    Jhon Phase II:      Last vitals: Reviewed and per EMR flowsheets.        Anesthesia Post Evaluation    Patient location during evaluation: PACU  Patient participation: complete - patient participated  Level of consciousness: awake  Pain score: 0  Airway patency: patent  Nausea & Vomiting: no nausea  Complications: no  Cardiovascular status: blood pressure returned to baseline  Respiratory status: acceptable  Hydration status: euvolemic

## 2021-08-11 LAB
CULTURE, RESPIRATORY: NORMAL
SMEAR, RESPIRATORY: NORMAL

## 2021-09-13 LAB
FUNGUS (MYCOLOGY) CULTURE: NORMAL
FUNGUS STAIN: NORMAL

## 2021-09-28 LAB
AFB CULTURE (MYCOBACTERIA): NORMAL
AFB SMEAR: NORMAL

## 2024-10-01 ENCOUNTER — HOSPITAL ENCOUNTER (INPATIENT)
Age: 74
LOS: 2 days | Discharge: HOME | DRG: 322 | End: 2024-10-03
Attending: EMERGENCY MEDICINE | Admitting: INTERNAL MEDICINE
Payer: MEDICARE

## 2024-10-01 ENCOUNTER — APPOINTMENT (OUTPATIENT)
Dept: GENERAL RADIOLOGY | Age: 74
DRG: 322 | End: 2024-10-01
Payer: MEDICARE

## 2024-10-01 DIAGNOSIS — I24.9 ACUTE CORONARY SYNDROME (HCC): ICD-10-CM

## 2024-10-01 DIAGNOSIS — I21.3 STEMI (ST ELEVATION MYOCARDIAL INFARCTION) (HCC): ICD-10-CM

## 2024-10-01 DIAGNOSIS — I21.3 ST ELEVATION MYOCARDIAL INFARCTION (STEMI), UNSPECIFIED ARTERY (HCC): Primary | ICD-10-CM

## 2024-10-01 LAB
ACTIVATED CLOTTING TIME, LOW RANGE: 214 SEC
ACTIVATED CLOTTING TIME, LOW RANGE: 312 SEC
ALBUMIN SERPL-MCNC: 4.3 G/DL (ref 3.5–5.2)
ALP SERPL-CCNC: 105 U/L (ref 40–129)
ALT SERPL-CCNC: 9 U/L (ref 0–40)
ANION GAP SERPL CALCULATED.3IONS-SCNC: 15 MMOL/L (ref 7–16)
AST SERPL-CCNC: 13 U/L (ref 0–39)
BASOPHILS # BLD: 0.07 K/UL (ref 0–0.2)
BASOPHILS NFR BLD: 1 % (ref 0–2)
BILIRUB SERPL-MCNC: 0.5 MG/DL (ref 0–1.2)
BNP SERPL-MCNC: 66 PG/ML (ref 0–450)
BUN SERPL-MCNC: 15 MG/DL (ref 6–23)
CALCIUM SERPL-MCNC: 9.4 MG/DL (ref 8.6–10.2)
CHLORIDE SERPL-SCNC: 101 MMOL/L (ref 98–107)
CO2 SERPL-SCNC: 25 MMOL/L (ref 22–29)
CREAT SERPL-MCNC: 1 MG/DL (ref 0.7–1.2)
EOSINOPHIL # BLD: 0.42 K/UL (ref 0.05–0.5)
EOSINOPHILS RELATIVE PERCENT: 3 % (ref 0–6)
ERYTHROCYTE [DISTWIDTH] IN BLOOD BY AUTOMATED COUNT: 14 % (ref 11.5–15)
GFR, ESTIMATED: 78 ML/MIN/1.73M2
GLUCOSE SERPL-MCNC: 131 MG/DL (ref 74–99)
HCT VFR BLD AUTO: 41.8 % (ref 37–54)
HGB BLD-MCNC: 12.5 G/DL (ref 12.5–16.5)
IMM GRANULOCYTES # BLD AUTO: 0.06 K/UL (ref 0–0.58)
IMM GRANULOCYTES NFR BLD: 0 % (ref 0–5)
INR PPP: 1.1
LYMPHOCYTES NFR BLD: 5.38 K/UL (ref 1.5–4)
LYMPHOCYTES RELATIVE PERCENT: 35 % (ref 20–42)
MCH RBC QN AUTO: 23 PG (ref 26–35)
MCHC RBC AUTO-ENTMCNC: 29.9 G/DL (ref 32–34.5)
MCV RBC AUTO: 76.8 FL (ref 80–99.9)
MONOCYTES NFR BLD: 1.03 K/UL (ref 0.1–0.95)
MONOCYTES NFR BLD: 7 % (ref 2–12)
NEUTROPHILS NFR BLD: 55 % (ref 43–80)
NEUTS SEG NFR BLD: 8.35 K/UL (ref 1.8–7.3)
PARTIAL THROMBOPLASTIN TIME: 29.7 SEC (ref 24.5–35.1)
PLATELET # BLD AUTO: 291 K/UL (ref 130–450)
PMV BLD AUTO: 12.3 FL (ref 7–12)
POTASSIUM SERPL-SCNC: 2.8 MMOL/L (ref 3.5–5)
PROT SERPL-MCNC: 7.1 G/DL (ref 6.4–8.3)
PROTHROMBIN TIME: 12.3 SEC (ref 9.3–12.4)
RBC # BLD AUTO: 5.44 M/UL (ref 3.8–5.8)
SODIUM SERPL-SCNC: 141 MMOL/L (ref 132–146)
TROPONIN I SERPL HS-MCNC: 10 NG/L (ref 0–11)
WBC OTHER # BLD: 15.3 K/UL (ref 4.5–11.5)

## 2024-10-01 PROCEDURE — 94664 DEMO&/EVAL PT USE INHALER: CPT

## 2024-10-01 PROCEDURE — 2709999900 HC NON-CHARGEABLE SUPPLY: Performed by: INTERNAL MEDICINE

## 2024-10-01 PROCEDURE — 99285 EMERGENCY DEPT VISIT HI MDM: CPT

## 2024-10-01 PROCEDURE — 85347 COAGULATION TIME ACTIVATED: CPT

## 2024-10-01 PROCEDURE — 85730 THROMBOPLASTIN TIME PARTIAL: CPT

## 2024-10-01 PROCEDURE — 84484 ASSAY OF TROPONIN QUANT: CPT

## 2024-10-01 PROCEDURE — 6370000000 HC RX 637 (ALT 250 FOR IP): Performed by: INTERNAL MEDICINE

## 2024-10-01 PROCEDURE — C1887 CATHETER, GUIDING: HCPCS | Performed by: INTERNAL MEDICINE

## 2024-10-01 PROCEDURE — 99291 CRITICAL CARE FIRST HOUR: CPT | Performed by: INTERNAL MEDICINE

## 2024-10-01 PROCEDURE — 6360000004 HC RX CONTRAST MEDICATION: Performed by: INTERNAL MEDICINE

## 2024-10-01 PROCEDURE — 2500000003 HC RX 250 WO HCPCS: Performed by: INTERNAL MEDICINE

## 2024-10-01 PROCEDURE — 83880 ASSAY OF NATRIURETIC PEPTIDE: CPT

## 2024-10-01 PROCEDURE — C1894 INTRO/SHEATH, NON-LASER: HCPCS | Performed by: INTERNAL MEDICINE

## 2024-10-01 PROCEDURE — 6360000002 HC RX W HCPCS: Performed by: INTERNAL MEDICINE

## 2024-10-01 PROCEDURE — 85610 PROTHROMBIN TIME: CPT

## 2024-10-01 PROCEDURE — 2140000000 HC CCU INTERMEDIATE R&B

## 2024-10-01 PROCEDURE — 92941 PRQ TRLML REVSC TOT OCCL AMI: CPT | Performed by: INTERNAL MEDICINE

## 2024-10-01 PROCEDURE — 0270346 DILATION OF CORONARY ARTERY, ONE ARTERY, BIFURCATION, WITH DRUG-ELUTING INTRALUMINAL DEVICE, PERCUTANEOUS APPROACH: ICD-10-PCS | Performed by: INTERNAL MEDICINE

## 2024-10-01 PROCEDURE — 93458 L HRT ARTERY/VENTRICLE ANGIO: CPT | Performed by: INTERNAL MEDICINE

## 2024-10-01 PROCEDURE — 4A023N7 MEASUREMENT OF CARDIAC SAMPLING AND PRESSURE, LEFT HEART, PERCUTANEOUS APPROACH: ICD-10-PCS | Performed by: INTERNAL MEDICINE

## 2024-10-01 PROCEDURE — B2151ZZ FLUOROSCOPY OF LEFT HEART USING LOW OSMOLAR CONTRAST: ICD-10-PCS | Performed by: INTERNAL MEDICINE

## 2024-10-01 PROCEDURE — 6360000002 HC RX W HCPCS: Performed by: EMERGENCY MEDICINE

## 2024-10-01 PROCEDURE — 94640 AIRWAY INHALATION TREATMENT: CPT

## 2024-10-01 PROCEDURE — 93005 ELECTROCARDIOGRAM TRACING: CPT

## 2024-10-01 PROCEDURE — C1769 GUIDE WIRE: HCPCS | Performed by: INTERNAL MEDICINE

## 2024-10-01 PROCEDURE — 2580000003 HC RX 258: Performed by: INTERNAL MEDICINE

## 2024-10-01 PROCEDURE — C1874 STENT, COATED/COV W/DEL SYS: HCPCS | Performed by: INTERNAL MEDICINE

## 2024-10-01 PROCEDURE — B2111ZZ FLUOROSCOPY OF MULTIPLE CORONARY ARTERIES USING LOW OSMOLAR CONTRAST: ICD-10-PCS | Performed by: INTERNAL MEDICINE

## 2024-10-01 PROCEDURE — C1725 CATH, TRANSLUMIN NON-LASER: HCPCS | Performed by: INTERNAL MEDICINE

## 2024-10-01 PROCEDURE — 80053 COMPREHEN METABOLIC PANEL: CPT

## 2024-10-01 PROCEDURE — 85025 COMPLETE CBC W/AUTO DIFF WBC: CPT

## 2024-10-01 DEVICE — STENT ONYXNG35034UX ONYX 3.50X34RX
Type: IMPLANTABLE DEVICE | Status: FUNCTIONAL
Brand: ONYX FRONTIER™

## 2024-10-01 RX ORDER — MIDAZOLAM HYDROCHLORIDE 1 MG/ML
INJECTION INTRAMUSCULAR; INTRAVENOUS PRN
Status: DISCONTINUED | OUTPATIENT
Start: 2024-10-01 | End: 2024-10-01 | Stop reason: HOSPADM

## 2024-10-01 RX ORDER — ONDANSETRON 2 MG/ML
4 INJECTION INTRAMUSCULAR; INTRAVENOUS EVERY 6 HOURS PRN
Status: DISCONTINUED | OUTPATIENT
Start: 2024-10-01 | End: 2024-10-03 | Stop reason: HOSPADM

## 2024-10-01 RX ORDER — HEPARIN SODIUM 1000 [USP'U]/ML
60 INJECTION, SOLUTION INTRAVENOUS; SUBCUTANEOUS PRN
Status: DISCONTINUED | OUTPATIENT
Start: 2024-10-01 | End: 2024-10-01

## 2024-10-01 RX ORDER — SODIUM CHLORIDE 9 MG/ML
INJECTION, SOLUTION INTRAVENOUS CONTINUOUS
Status: ACTIVE | OUTPATIENT
Start: 2024-10-01 | End: 2024-10-02

## 2024-10-01 RX ORDER — ONDANSETRON 4 MG/1
4 TABLET, ORALLY DISINTEGRATING ORAL EVERY 8 HOURS PRN
Status: DISCONTINUED | OUTPATIENT
Start: 2024-10-01 | End: 2024-10-03 | Stop reason: HOSPADM

## 2024-10-01 RX ORDER — EPTIFIBATIDE 2 MG/ML
INJECTION, SOLUTION INTRAVENOUS PRN
Status: DISCONTINUED | OUTPATIENT
Start: 2024-10-01 | End: 2024-10-01 | Stop reason: HOSPADM

## 2024-10-01 RX ORDER — METOPROLOL SUCCINATE 25 MG/1
25 TABLET, EXTENDED RELEASE ORAL DAILY
Status: DISCONTINUED | OUTPATIENT
Start: 2024-10-01 | End: 2024-10-02

## 2024-10-01 RX ORDER — SODIUM CHLORIDE 0.9 % (FLUSH) 0.9 %
5-40 SYRINGE (ML) INJECTION EVERY 12 HOURS SCHEDULED
Status: DISCONTINUED | OUTPATIENT
Start: 2024-10-01 | End: 2024-10-03 | Stop reason: HOSPADM

## 2024-10-01 RX ORDER — SODIUM CHLORIDE 0.9 % (FLUSH) 0.9 %
5-40 SYRINGE (ML) INJECTION PRN
Status: DISCONTINUED | OUTPATIENT
Start: 2024-10-01 | End: 2024-10-03 | Stop reason: HOSPADM

## 2024-10-01 RX ORDER — FOLIC ACID 1 MG/1
1 TABLET ORAL DAILY
Status: DISCONTINUED | OUTPATIENT
Start: 2024-10-01 | End: 2024-10-03 | Stop reason: HOSPADM

## 2024-10-01 RX ORDER — IPRATROPIUM BROMIDE AND ALBUTEROL SULFATE 2.5; .5 MG/3ML; MG/3ML
1 SOLUTION RESPIRATORY (INHALATION) EVERY 4 HOURS PRN
Status: DISCONTINUED | OUTPATIENT
Start: 2024-10-01 | End: 2024-10-03 | Stop reason: HOSPADM

## 2024-10-01 RX ORDER — MAGNESIUM SULFATE IN WATER 40 MG/ML
2000 INJECTION, SOLUTION INTRAVENOUS PRN
Status: DISCONTINUED | OUTPATIENT
Start: 2024-10-01 | End: 2024-10-03 | Stop reason: HOSPADM

## 2024-10-01 RX ORDER — HEPARIN SODIUM 1000 [USP'U]/ML
60 INJECTION, SOLUTION INTRAVENOUS; SUBCUTANEOUS ONCE
Status: COMPLETED | OUTPATIENT
Start: 2024-10-01 | End: 2024-10-01

## 2024-10-01 RX ORDER — HEPARIN SODIUM 10000 [USP'U]/ML
INJECTION, SOLUTION INTRAVENOUS; SUBCUTANEOUS PRN
Status: DISCONTINUED | OUTPATIENT
Start: 2024-10-01 | End: 2024-10-01 | Stop reason: HOSPADM

## 2024-10-01 RX ORDER — POTASSIUM CHLORIDE 7.45 MG/ML
10 INJECTION INTRAVENOUS PRN
Status: DISCONTINUED | OUTPATIENT
Start: 2024-10-01 | End: 2024-10-02

## 2024-10-01 RX ORDER — ACETAMINOPHEN 650 MG/1
650 SUPPOSITORY RECTAL EVERY 6 HOURS PRN
Status: DISCONTINUED | OUTPATIENT
Start: 2024-10-01 | End: 2024-10-01 | Stop reason: SDUPTHER

## 2024-10-01 RX ORDER — FENTANYL CITRATE 50 UG/ML
INJECTION, SOLUTION INTRAMUSCULAR; INTRAVENOUS PRN
Status: DISCONTINUED | OUTPATIENT
Start: 2024-10-01 | End: 2024-10-01 | Stop reason: HOSPADM

## 2024-10-01 RX ORDER — ALBUTEROL SULFATE 0.83 MG/ML
2.5 SOLUTION RESPIRATORY (INHALATION) 4 TIMES DAILY PRN
Status: DISCONTINUED | OUTPATIENT
Start: 2024-10-01 | End: 2024-10-03 | Stop reason: HOSPADM

## 2024-10-01 RX ORDER — MULTIVITAMIN WITH IRON
1 TABLET ORAL DAILY
Status: DISCONTINUED | OUTPATIENT
Start: 2024-10-01 | End: 2024-10-03 | Stop reason: HOSPADM

## 2024-10-01 RX ORDER — SODIUM CHLORIDE 9 MG/ML
INJECTION, SOLUTION INTRAVENOUS PRN
Status: DISCONTINUED | OUTPATIENT
Start: 2024-10-01 | End: 2024-10-03 | Stop reason: HOSPADM

## 2024-10-01 RX ORDER — EPTIFIBATIDE 0.75 MG/ML
INJECTION, SOLUTION INTRAVENOUS CONTINUOUS PRN
Status: COMPLETED | OUTPATIENT
Start: 2024-10-01 | End: 2024-10-01

## 2024-10-01 RX ORDER — MORPHINE SULFATE 4 MG/ML
4 INJECTION, SOLUTION INTRAMUSCULAR; INTRAVENOUS ONCE
Status: COMPLETED | OUTPATIENT
Start: 2024-10-01 | End: 2024-10-01

## 2024-10-01 RX ORDER — NITROGLYCERIN 20 MG/100ML
INJECTION INTRAVENOUS PRN
Status: DISCONTINUED | OUTPATIENT
Start: 2024-10-01 | End: 2024-10-01 | Stop reason: HOSPADM

## 2024-10-01 RX ORDER — ASPIRIN 81 MG/1
81 TABLET, CHEWABLE ORAL DAILY
Status: DISCONTINUED | OUTPATIENT
Start: 2024-10-02 | End: 2024-10-03 | Stop reason: HOSPADM

## 2024-10-01 RX ORDER — VERAPAMIL HYDROCHLORIDE 2.5 MG/ML
INJECTION, SOLUTION INTRAVENOUS PRN
Status: DISCONTINUED | OUTPATIENT
Start: 2024-10-01 | End: 2024-10-01 | Stop reason: HOSPADM

## 2024-10-01 RX ORDER — THIAMINE HYDROCHLORIDE 100 MG/ML
100 INJECTION, SOLUTION INTRAMUSCULAR; INTRAVENOUS DAILY
Status: DISCONTINUED | OUTPATIENT
Start: 2024-10-01 | End: 2024-10-03 | Stop reason: HOSPADM

## 2024-10-01 RX ORDER — HEPARIN SODIUM 10000 [USP'U]/100ML
5-30 INJECTION, SOLUTION INTRAVENOUS CONTINUOUS
Status: DISCONTINUED | OUTPATIENT
Start: 2024-10-01 | End: 2024-10-01

## 2024-10-01 RX ORDER — EPTIFIBATIDE 0.75 MG/ML
2 INJECTION, SOLUTION INTRAVENOUS CONTINUOUS
Status: DISPENSED | OUTPATIENT
Start: 2024-10-01 | End: 2024-10-02

## 2024-10-01 RX ORDER — NITROGLYCERIN 20 MG/100ML
INJECTION INTRAVENOUS CONTINUOUS PRN
Status: DISCONTINUED | OUTPATIENT
Start: 2024-10-01 | End: 2024-10-01 | Stop reason: HOSPADM

## 2024-10-01 RX ORDER — SUCRALFATE 1 G/1
1 TABLET ORAL 4 TIMES DAILY
Status: DISCONTINUED | OUTPATIENT
Start: 2024-10-01 | End: 2024-10-03 | Stop reason: HOSPADM

## 2024-10-01 RX ORDER — LISINOPRIL 5 MG/1
5 TABLET ORAL DAILY
Status: DISCONTINUED | OUTPATIENT
Start: 2024-10-01 | End: 2024-10-02

## 2024-10-01 RX ORDER — PANTOPRAZOLE SODIUM 40 MG/1
40 TABLET, DELAYED RELEASE ORAL
Status: DISCONTINUED | OUTPATIENT
Start: 2024-10-01 | End: 2024-10-03 | Stop reason: HOSPADM

## 2024-10-01 RX ORDER — ACETAMINOPHEN 325 MG/1
650 TABLET ORAL EVERY 6 HOURS PRN
Status: DISCONTINUED | OUTPATIENT
Start: 2024-10-01 | End: 2024-10-01 | Stop reason: SDUPTHER

## 2024-10-01 RX ORDER — POTASSIUM CHLORIDE 1500 MG/1
40 TABLET, EXTENDED RELEASE ORAL PRN
Status: DISCONTINUED | OUTPATIENT
Start: 2024-10-01 | End: 2024-10-03 | Stop reason: HOSPADM

## 2024-10-01 RX ORDER — IOPAMIDOL 755 MG/ML
INJECTION, SOLUTION INTRAVASCULAR PRN
Status: DISCONTINUED | OUTPATIENT
Start: 2024-10-01 | End: 2024-10-01 | Stop reason: HOSPADM

## 2024-10-01 RX ORDER — ACETAMINOPHEN 325 MG/1
650 TABLET ORAL EVERY 4 HOURS PRN
Status: DISCONTINUED | OUTPATIENT
Start: 2024-10-01 | End: 2024-10-03 | Stop reason: HOSPADM

## 2024-10-01 RX ORDER — ROSUVASTATIN CALCIUM 10 MG/1
40 TABLET, COATED ORAL NIGHTLY
Status: DISCONTINUED | OUTPATIENT
Start: 2024-10-01 | End: 2024-10-03 | Stop reason: HOSPADM

## 2024-10-01 RX ORDER — POLYETHYLENE GLYCOL 3350 17 G/17G
17 POWDER, FOR SOLUTION ORAL DAILY PRN
Status: DISCONTINUED | OUTPATIENT
Start: 2024-10-01 | End: 2024-10-03 | Stop reason: HOSPADM

## 2024-10-01 RX ORDER — NITROGLYCERIN 20 MG/100ML
5-200 INJECTION INTRAVENOUS CONTINUOUS
Status: DISCONTINUED | OUTPATIENT
Start: 2024-10-01 | End: 2024-10-01

## 2024-10-01 RX ORDER — DOXYCYCLINE 100 MG/1
100 CAPSULE ORAL EVERY 12 HOURS SCHEDULED
Status: DISCONTINUED | OUTPATIENT
Start: 2024-10-01 | End: 2024-10-03 | Stop reason: HOSPADM

## 2024-10-01 RX ORDER — NITROGLYCERIN 0.4 MG/1
0.4 TABLET SUBLINGUAL EVERY 5 MIN PRN
Status: DISCONTINUED | OUTPATIENT
Start: 2024-10-01 | End: 2024-10-03 | Stop reason: HOSPADM

## 2024-10-01 RX ORDER — HEPARIN SODIUM 1000 [USP'U]/ML
30 INJECTION, SOLUTION INTRAVENOUS; SUBCUTANEOUS PRN
Status: DISCONTINUED | OUTPATIENT
Start: 2024-10-01 | End: 2024-10-01

## 2024-10-01 RX ADMIN — ROSUVASTATIN 40 MG: 20 TABLET, FILM COATED ORAL at 23:10

## 2024-10-01 RX ADMIN — PANTOPRAZOLE SODIUM 40 MG: 40 TABLET, DELAYED RELEASE ORAL at 23:10

## 2024-10-01 RX ADMIN — EPTIFIBATIDE 2 MCG/KG/MIN: 0.75 INJECTION INTRAVENOUS at 22:09

## 2024-10-01 RX ADMIN — METOPROLOL SUCCINATE 25 MG: 25 TABLET, EXTENDED RELEASE ORAL at 18:02

## 2024-10-01 RX ADMIN — POTASSIUM BICARBONATE 40 MEQ: 782 TABLET, EFFERVESCENT ORAL at 18:01

## 2024-10-01 RX ADMIN — NITROGLYCERIN 1 INCH: 20 OINTMENT TOPICAL at 18:02

## 2024-10-01 RX ADMIN — NITROGLYCERIN 1 INCH: 20 OINTMENT TOPICAL at 23:48

## 2024-10-01 RX ADMIN — IPRATROPIUM BROMIDE AND ALBUTEROL SULFATE 1 DOSE: 2.5; .5 SOLUTION RESPIRATORY (INHALATION) at 19:03

## 2024-10-01 RX ADMIN — SODIUM CHLORIDE: 9 INJECTION, SOLUTION INTRAVENOUS at 19:24

## 2024-10-01 RX ADMIN — DOXYCYCLINE HYCLATE 100 MG: 100 CAPSULE ORAL at 23:09

## 2024-10-01 RX ADMIN — LISINOPRIL 5 MG: 5 TABLET ORAL at 18:56

## 2024-10-01 RX ADMIN — POTASSIUM BICARBONATE 40 MEQ: 782 TABLET, EFFERVESCENT ORAL at 23:10

## 2024-10-01 RX ADMIN — FOLIC ACID 1 MG: 1 TABLET ORAL at 23:48

## 2024-10-01 RX ADMIN — SUCRALFATE 1 G: 1 TABLET ORAL at 23:11

## 2024-10-01 RX ADMIN — THIAMINE HYDROCHLORIDE 100 MG: 100 INJECTION, SOLUTION INTRAMUSCULAR; INTRAVENOUS at 23:11

## 2024-10-01 RX ADMIN — MORPHINE SULFATE 4 MG: 4 INJECTION, SOLUTION INTRAMUSCULAR; INTRAVENOUS at 16:07

## 2024-10-01 RX ADMIN — HEPARIN SODIUM AND DEXTROSE 12 UNITS/KG/HR: 10000; 5 INJECTION INTRAVENOUS at 16:06

## 2024-10-01 RX ADMIN — MULTIVITAMIN TABLET 1 TABLET: TABLET at 23:09

## 2024-10-01 RX ADMIN — HEPARIN SODIUM 3500 UNITS: 1000 INJECTION, SOLUTION INTRAVENOUS; SUBCUTANEOUS at 16:04

## 2024-10-01 ASSESSMENT — PAIN SCALES - GENERAL
PAINLEVEL_OUTOF10: 0
PAINLEVEL_OUTOF10: 0
PAINLEVEL_OUTOF10: 10

## 2024-10-01 NOTE — PROGRESS NOTES
4 Eyes Skin Assessment     NAME:  Erik Martinez  YOB: 1950  MEDICAL RECORD NUMBER:  [id number]    The patient is being assessed for  Admission    I agree that at least one RN has performed a thorough Head to Toe Skin Assessment on the patient. ALL assessment sites listed below have been assessed.      Areas assessed by both nurses:    Head, Face, Ears, Shoulders, Back, Chest, Arms, Elbows, Hands, Sacrum. Buttock, Coccyx, Ischium, and Legs. Feet and Heels        Does the Patient have a Wound? No noted wound(s)       Arnulfo Prevention initiated by RN: No  Wound Care Orders initiated by RN: No    Pressure Injury (Stage 3,4, Unstageable, DTI, NWPT, and Complex wounds) if present, place Wound referral order by RN under : No    New Ostomies, if present place, Ostomy referral order under : No     Nurse 1 eSignature: Electronically signed by Kimberly RossiPallante, RN on 10/1/24 at 6:37 PM EDT    **SHARE this note so that the co-signing nurse can place an eSignature**    Nurse 2 eSignature: Electronically signed by Sirisha Platt RN on 10/1/24 at 6:38 PM EDT

## 2024-10-01 NOTE — H&P
[facility] Hospitalist Group History and Physical          PCP: No primary care provider on file.    Date of Admission: 10/1/2024    Date of Service: Pt seen/examined on 10/1/2024 and is admitted to Inpatient with expected LOS greater than two midnights due to medical therapy.  Placed Inpatient    Chief Complaint:  had concerns including Chest Pain (Left sided chest pain, burning. EMS Gave 324mg ASA pta).    History Of Present Illness:    Mr. Erik Martinez, a 73 y.o. year old male  who  has a past medical history of Arthritis, Hypertension, and Tobacco abuse. COPD, history of alcohol use who presented emergency department with sudden onset of severe burning left-sided chest pain.  Patient stated he was taking a shower and suddenly felt burning sensation left-sided, some shortness of breath, nausea, called EMS, brought to the ED.  Denies prior history of CAD/MI, denies recent alcohol use.  Has not been taking his medications as prescribed, continues to smoke.  Denies any falls, bleeding, weight changes, or swelling, malignancy.  Denies lightheadedness, dizziness, syncope    Upon arrival patient's BP was 200/137, potassium 2.8, EKG showed sinus rhythm, ST elevation anterior septal/lateral leads with reciprocal changes, cardiology notified, patient received full dose aspirin, he was taken emergently to Cath Lab.  Underwent heart catheterization, had a PCI and stent to LAD, final Cath Lab results pending.  Otherwise chest pain-free currently, full code.    Past Medical History:        Diagnosis Date    Arthritis     Hypertension     Tobacco abuse        Past Surgical History:        Procedure Laterality Date    BRONCHOSCOPY N/A 8/9/2021    BRONCHOSCOPY ALVEOLAR LAVAGE performed by Sanjay Fishman DO [place] [facility]    BRONCHOSCOPY N/A 8/9/2021    BRONCHOSCOPY BIOPSY BRONCHUS performed by Sanjay Fishman DO at [facility] [place] [facility]    BRONCHOSCOPY N/A 8/9/2021    BRONCHOSCOPY W/EBUS FNA performed by Sanjay Fishman DO at  angiographic luminal narrowing LAD: Occluded just after its origin just after the takeoff of a high diagonal branch.  50% proximal stenosis of the diagonal branch and 70% mid to distal stenosis before the diagonal branch divided into 2 subdivisions LCx: Luminal irregularities without any significant angiographic luminal narrowing RCA: Large and dominant very large caliber vessel with 50% proximal luminal narrowing (as compared to the short ostial/proximal segment and more distal proximal segment and mid vessel).  The right coronary artery has luminal irregularities along its course.  It tapers down distally to less than half its size as compared to the mid and proximal vessel but with no significant/high-grade narrowings.  The posterior ascending artery branch and the posterolateral branch did not appear to have significant disease.  3.  Normal ventricular size with apical hypokinesis with an estimate ejection fraction 50% 4.  Mild mitral regurgitation which appeared catheter induced 5.  Systemic hypertension 6.  LVEDP = 16 mmHg 7.  No gradient across aortic valve on pullback 8.  Successful balloon angioplasty with the deployment of a drug-eluting coronary stent to the very proximal LAD with dilatation of the stent post deployment with a larger high-pressure noncompliant balloon with very good results without any significant residual stenosis and with restoration of MARISABEL-3 flow in the vessel      ASSESSMENT and PLAN:    Acute Anterior STEMI  Hypertension Crisis  Medication noncompliance  P/w symptoms of chest pain, some nausea, shortness of breath  Underwent emergent cardiac catheterization, PCI s/p TONY to LAD  Final heart cath report pending, seen by Dr. Pinto in the ED  Initial /137, has not been taking any medications recently  Received Brilinta, heparin drip, eptifibatide, cardiology following  Chest pain-free now, on aspirin, lisinopril, metoprolol, Crestor    RML Pneumonia   COPD w/o Exacerbation  Active

## 2024-10-01 NOTE — ED PROVIDER NOTES
HPI:  10/1/24, Time: 3:57 PM EDT         Erik Martinez is a 73 y.o. male presenting to the ED for sudden onset severe burning left-sided chest pain which is nonradiating beginning about 1 hour prior to arrival.  Patient presents via EMS.  EMS states that they administered full dose aspirin prior to arrival.  No other medications given.  Patient denies any history of CAD/MI.  Denies any drug use.  Denies recent illness, fevers, abdominal pain, nausea, vomiting.    I reviewed the patient's chart.  Patient mid on 8/4/2021 for aspiration pneumonia.  History of alcohol abuse.    --------------------------------------------- PAST HISTORY ---------------------------------------------  Past Medical History:  has a past medical history of Arthritis, Hypertension, and Tobacco abuse.    Past Surgical History:  has a past surgical history that includes Upper gastrointestinal endoscopy (N/A, 8/5/2021); bronchoscopy (N/A, 8/9/2021); bronchoscopy (N/A, 8/9/2021); and bronchoscopy (N/A, 8/9/2021).    Social History:  reports that he has been smoking cigarettes. He has a 50 pack-year smoking history. He has never used smokeless tobacco. He reports current alcohol use. He reports that he does not use drugs.    Family History: family history is not on file.     The patient’s home medications have been reviewed.    Allergies: Patient has no known allergies.    -------------------------------------------------- RESULTS -------------------------------------------------  All laboratory and radiology results have been personally reviewed by myself   LABS:  No results found for this visit on 10/01/24.    RADIOLOGY:  Interpreted by Radiologist.  XR CHEST PORTABLE    (Results Pending)       ------------------------- NURSING NOTES AND VITALS REVIEWED ---------------------------   The nursing notes within the ED encounter and vital signs as below have been reviewed.   BP (!) 200/128   Pulse 83   Resp 24   Wt 59 kg (130 lb)   SpO2 97%   BMI          Critical Care:  Please note that the withdrawal or failure to initiate urgent interventions for this patient would likely result in a life threatening deterioration or permanent disability.      Accordingly this patient received 31 minutes of critical care time, excluding separately billable procedures.      Counseling:   The emergency provider has spoken with the patient and discussed today’s results, in addition to providing specific details for the plan of care and counseling regarding the diagnosis and prognosis.  Questions are answered at this time and they are agreeable with the plan.      --------------------------------- IMPRESSION AND DISPOSITION ---------------------------------    IMPRESSION  1. ST elevation myocardial infarction (STEMI), unspecified artery (HCC)    2. STEMI (ST elevation myocardial infarction) (HCC)        DISPOSITION  Disposition: Transfer to cath lab  Patient condition is stable      NOTE: This report was transcribed using voice recognition software. Every effort was made to ensure accuracy; however, inadvertent computerized transcription errors may be present    ILoly MD, am the primary provider of this record        Loly Tijerina MD  10/01/24 2829

## 2024-10-01 NOTE — ED NOTES
Time Heart Alert called:1543    Cardiology paged:[phone] marisel    Cardiology call back:[phone] marisel    Patient to Cath Lab:

## 2024-10-01 NOTE — CONSULTS
Inpatient Cardiology Consultation      Reason for Consult: Acute anterior STEMI    Consulting Physician: Cameron Pinto MD    Requesting Physician:  Loly Tijerina MD    Date of Consultation: 10/1/2024    HISTORY OF PRESENT ILLNESS:   73-year-old male with hypertension and tobacco abuse.  Presented to the ED 10/1/2024 in the afternoon with chest pain that reportedly started an hour prior to presentation.  EKG showed sinus rhythm with ST elevation in the anterior leads and with reciprocal changes in the inferior and lateral leads consistent with acute anterior ST elevation myocardial infarction.  The patient received aspirin, sublingual nitroglycerin and heparin bolus.  Interventional cardiology was contacted.  The Cath Lab was activated.  En route to the Cath Lab, he was started on IV nitroglycerin.  On arrival to the Cath Lab he was given 180 mg of crushed Brilinta to swallow.  He did receive morphine in the ED.  He was also given IV fentanyl and IV Versed on arrival to the Cath Lab.      Please note: past medical records were reviewed per electronic medical record (EMR) - see detailed reports under Past Medical/ Surgical History.   Past Medical History:    Past Medical History:   Diagnosis Date    Arthritis     Hypertension     Tobacco abuse    History of gastritis, duodenitis, severe esophagitis and small type I hiatal hernia (EGD 8/5/2021)    Past Surgical History:    Past Surgical History:   Procedure Laterality Date    BRONCHOSCOPY N/A 8/9/2021    BRONCHOSCOPY ALVEOLAR LAVAGE performed by Sanjay Fishman DO at [facility] [place] [facility]    BRONCHOSCOPY N/A 8/9/2021    BRONCHOSCOPY BIOPSY BRONCHUS performed by Sanjay Fishman DO at [facility] [place] [facility]    BRONCHOSCOPY N/A 8/9/2021    BRONCHOSCOPY W/EBUS FNA performed by Sanjay Fishman DO at [facility] [place] [facility]    UPPER GASTROINTESTINAL ENDOSCOPY N/A 8/5/2021    EGD BIOPSY performed by Rylan Bowling MD at [facility] [place] [facility]       Medications Prior to admit:  Prior to Admission

## 2024-10-02 ENCOUNTER — APPOINTMENT (OUTPATIENT)
Dept: GENERAL RADIOLOGY | Age: 74
DRG: 322 | End: 2024-10-02
Payer: MEDICARE

## 2024-10-02 ENCOUNTER — APPOINTMENT (OUTPATIENT)
Age: 74
DRG: 322 | End: 2024-10-02
Attending: INTERNAL MEDICINE
Payer: MEDICARE

## 2024-10-02 LAB
ALBUMIN SERPL-MCNC: 4 G/DL (ref 3.5–5.2)
ALP SERPL-CCNC: 88 U/L (ref 40–129)
ALT SERPL-CCNC: 12 U/L (ref 0–40)
ANION GAP SERPL CALCULATED.3IONS-SCNC: 13 MMOL/L (ref 7–16)
AST SERPL-CCNC: 61 U/L (ref 0–39)
BILIRUB DIRECT SERPL-MCNC: <0.2 MG/DL (ref 0–0.3)
BILIRUB INDIRECT SERPL-MCNC: ABNORMAL MG/DL (ref 0–1)
BILIRUB SERPL-MCNC: 0.6 MG/DL (ref 0–1.2)
BNP SERPL-MCNC: 880 PG/ML (ref 0–450)
BUN SERPL-MCNC: 12 MG/DL (ref 6–23)
CALCIUM SERPL-MCNC: 9.4 MG/DL (ref 8.6–10.2)
CHLORIDE SERPL-SCNC: 103 MMOL/L (ref 98–107)
CHOLEST SERPL-MCNC: 191 MG/DL
CO2 SERPL-SCNC: 24 MMOL/L (ref 22–29)
CREAT SERPL-MCNC: 0.9 MG/DL (ref 0.7–1.2)
ECHO AO ASC DIAM: 3.2 CM
ECHO AV AREA PEAK VELOCITY: 1.6 CM2
ECHO AV AREA VTI: 1.6 CM2
ECHO AV CUSP MM: 1.9 CM
ECHO AV MEAN GRADIENT: 5 MMHG
ECHO AV MEAN VELOCITY: 1 M/S
ECHO AV PEAK GRADIENT: 10 MMHG
ECHO AV PEAK VELOCITY: 1.6 M/S
ECHO AV VELOCITY RATIO: 0.5
ECHO AV VTI: 26.7 CM
ECHO LA DIAMETER: 3.9 CM
ECHO LA VOL A-L A2C: 81 ML (ref 18–58)
ECHO LA VOL A-L A4C: 69 ML (ref 18–58)
ECHO LA VOL MOD A2C: 78 ML (ref 18–58)
ECHO LA VOL MOD A4C: 62 ML (ref 18–58)
ECHO LA VOLUME AREA LENGTH: 75 ML
ECHO LV EDV A2C: 84 ML
ECHO LV EDV A4C: 88 ML
ECHO LV EDV BP: 88 ML (ref 67–155)
ECHO LV EF PHYSICIAN: 35 %
ECHO LV EJECTION FRACTION A2C: 34 %
ECHO LV EJECTION FRACTION A4C: 36 %
ECHO LV EJECTION FRACTION BIPLANE: 34 % (ref 55–100)
ECHO LV ESV A2C: 56 ML
ECHO LV ESV A4C: 57 ML
ECHO LV ESV BP: 58 ML (ref 22–58)
ECHO LV FRACTIONAL SHORTENING: 18 % (ref 28–44)
ECHO LV INTERNAL DIMENSION DIASTOLIC: 4.9 CM (ref 4.2–5.9)
ECHO LV INTERNAL DIMENSION SYSTOLIC: 4 CM
ECHO LV IVSD: 0.9 CM (ref 0.6–1)
ECHO LV IVSS: 1.1 CM
ECHO LV MASS 2D: 153 G (ref 88–224)
ECHO LV POSTERIOR WALL DIASTOLIC: 0.9 CM (ref 0.6–1)
ECHO LV POSTERIOR WALL SYSTOLIC: 1.1 CM
ECHO LV RELATIVE WALL THICKNESS RATIO: 0.37
ECHO LVOT AREA: 3.1 CM2
ECHO LVOT AV VTI INDEX: 0.52
ECHO LVOT DIAM: 2 CM
ECHO LVOT MEAN GRADIENT: 1 MMHG
ECHO LVOT PEAK GRADIENT: 3 MMHG
ECHO LVOT PEAK VELOCITY: 0.8 M/S
ECHO LVOT SV: 43.6 ML
ECHO LVOT VTI: 13.9 CM
ECHO MV "A" WAVE DURATION: 93.4 MSEC
ECHO MV A VELOCITY: 0.98 M/S
ECHO MV AREA PHT: 3.6 CM2
ECHO MV AREA VTI: 2.2 CM2
ECHO MV E DECELERATION TIME (DT): 122.2 MS
ECHO MV E VELOCITY: 0.71 M/S
ECHO MV E/A RATIO: 0.72
ECHO MV LVOT VTI INDEX: 1.4
ECHO MV MAX VELOCITY: 1.1 M/S
ECHO MV MEAN GRADIENT: 2 MMHG
ECHO MV MEAN VELOCITY: 0.6 M/S
ECHO MV PEAK GRADIENT: 5 MMHG
ECHO MV PRESSURE HALF TIME (PHT): 61.4 MS
ECHO MV VTI: 19.5 CM
ECHO PV MAX VELOCITY: 0.8 M/S
ECHO PV MEAN GRADIENT: 1 MMHG
ECHO PV MEAN VELOCITY: 0.6 M/S
ECHO PV PEAK GRADIENT: 3 MMHG
ECHO PV VTI: 14.1 CM
ECHO RV INTERNAL DIMENSION: 3 CM
EKG ATRIAL RATE: 74 BPM
EKG P AXIS: 80 DEGREES
EKG P-R INTERVAL: 206 MS
EKG Q-T INTERVAL: 420 MS
EKG QRS DURATION: 98 MS
EKG QTC CALCULATION (BAZETT): 466 MS
EKG R AXIS: 59 DEGREES
EKG T AXIS: 27 DEGREES
EKG VENTRICULAR RATE: 74 BPM
ERYTHROCYTE [DISTWIDTH] IN BLOOD BY AUTOMATED COUNT: 13.8 % (ref 11.5–15)
GFR, ESTIMATED: 89 ML/MIN/1.73M2
GLUCOSE SERPL-MCNC: 100 MG/DL (ref 74–99)
HBA1C MFR BLD: 4.8 % (ref 4–5.6)
HCT VFR BLD AUTO: 39.4 % (ref 37–54)
HDLC SERPL-MCNC: 43 MG/DL
HGB BLD-MCNC: 11.8 G/DL (ref 12.5–16.5)
LDLC SERPL CALC-MCNC: 128 MG/DL
MCH RBC QN AUTO: 23.1 PG (ref 26–35)
MCHC RBC AUTO-ENTMCNC: 29.9 G/DL (ref 32–34.5)
MCV RBC AUTO: 77.1 FL (ref 80–99.9)
PHOSPHATE SERPL-MCNC: 2.9 MG/DL (ref 2.5–4.5)
PLATELET # BLD AUTO: 247 K/UL (ref 130–450)
PMV BLD AUTO: 12.4 FL (ref 7–12)
POTASSIUM SERPL-SCNC: 4.3 MMOL/L (ref 3.5–5)
PROCALCITONIN SERPL-MCNC: 0.14 NG/ML (ref 0–0.08)
PROT SERPL-MCNC: 6.6 G/DL (ref 6.4–8.3)
RBC # BLD AUTO: 5.11 M/UL (ref 3.8–5.8)
SODIUM SERPL-SCNC: 140 MMOL/L (ref 132–146)
TRIGL SERPL-MCNC: 100 MG/DL
VLDLC SERPL CALC-MCNC: 20 MG/DL
WBC OTHER # BLD: 12.9 K/UL (ref 4.5–11.5)

## 2024-10-02 PROCEDURE — 80061 LIPID PANEL: CPT

## 2024-10-02 PROCEDURE — 6360000002 HC RX W HCPCS: Performed by: NURSE PRACTITIONER

## 2024-10-02 PROCEDURE — 99232 SBSQ HOSP IP/OBS MODERATE 35: CPT | Performed by: INTERNAL MEDICINE

## 2024-10-02 PROCEDURE — 2700000000 HC OXYGEN THERAPY PER DAY

## 2024-10-02 PROCEDURE — 83036 HEMOGLOBIN GLYCOSYLATED A1C: CPT

## 2024-10-02 PROCEDURE — 80053 COMPREHEN METABOLIC PANEL: CPT

## 2024-10-02 PROCEDURE — 6360000002 HC RX W HCPCS: Performed by: INTERNAL MEDICINE

## 2024-10-02 PROCEDURE — 6370000000 HC RX 637 (ALT 250 FOR IP): Performed by: INTERNAL MEDICINE

## 2024-10-02 PROCEDURE — 84100 ASSAY OF PHOSPHORUS: CPT

## 2024-10-02 PROCEDURE — 2580000003 HC RX 258: Performed by: INTERNAL MEDICINE

## 2024-10-02 PROCEDURE — 2140000000 HC CCU INTERMEDIATE R&B

## 2024-10-02 PROCEDURE — 99233 SBSQ HOSP IP/OBS HIGH 50: CPT | Performed by: INTERNAL MEDICINE

## 2024-10-02 PROCEDURE — 93306 TTE W/DOPPLER COMPLETE: CPT | Performed by: INTERNAL MEDICINE

## 2024-10-02 PROCEDURE — 82248 BILIRUBIN DIRECT: CPT

## 2024-10-02 PROCEDURE — 94640 AIRWAY INHALATION TREATMENT: CPT

## 2024-10-02 PROCEDURE — 93306 TTE W/DOPPLER COMPLETE: CPT

## 2024-10-02 PROCEDURE — 6370000000 HC RX 637 (ALT 250 FOR IP): Performed by: NURSE PRACTITIONER

## 2024-10-02 PROCEDURE — 85027 COMPLETE CBC AUTOMATED: CPT

## 2024-10-02 PROCEDURE — 36415 COLL VENOUS BLD VENIPUNCTURE: CPT

## 2024-10-02 PROCEDURE — 84145 PROCALCITONIN (PCT): CPT

## 2024-10-02 PROCEDURE — 71045 X-RAY EXAM CHEST 1 VIEW: CPT

## 2024-10-02 PROCEDURE — 2580000003 HC RX 258: Performed by: NURSE PRACTITIONER

## 2024-10-02 PROCEDURE — 83880 ASSAY OF NATRIURETIC PEPTIDE: CPT

## 2024-10-02 RX ORDER — ENOXAPARIN SODIUM 100 MG/ML
40 INJECTION SUBCUTANEOUS EVERY 24 HOURS
Status: DISCONTINUED | OUTPATIENT
Start: 2024-10-02 | End: 2024-10-03 | Stop reason: HOSPADM

## 2024-10-02 RX ORDER — LISINOPRIL 5 MG/1
10 TABLET ORAL DAILY
Status: DISCONTINUED | OUTPATIENT
Start: 2024-10-03 | End: 2024-10-03 | Stop reason: HOSPADM

## 2024-10-02 RX ORDER — FLUTICASONE FUROATE, UMECLIDINIUM BROMIDE AND VILANTEROL TRIFENATATE 100; 62.5; 25 UG/1; UG/1; UG/1
1 POWDER RESPIRATORY (INHALATION) DAILY
Qty: 1 EACH | Refills: 3 | Status: SHIPPED | OUTPATIENT
Start: 2024-10-02

## 2024-10-02 RX ORDER — CLOPIDOGREL 300 MG/1
300 TABLET, FILM COATED ORAL ONCE
Status: COMPLETED | OUTPATIENT
Start: 2024-10-02 | End: 2024-10-02

## 2024-10-02 RX ORDER — ISOSORBIDE MONONITRATE 30 MG/1
30 TABLET, EXTENDED RELEASE ORAL DAILY
Status: DISCONTINUED | OUTPATIENT
Start: 2024-10-02 | End: 2024-10-03 | Stop reason: HOSPADM

## 2024-10-02 RX ORDER — METOPROLOL SUCCINATE 25 MG/1
50 TABLET, EXTENDED RELEASE ORAL DAILY
Status: DISCONTINUED | OUTPATIENT
Start: 2024-10-03 | End: 2024-10-03 | Stop reason: HOSPADM

## 2024-10-02 RX ORDER — PREDNISONE 10 MG/1
TABLET ORAL
Qty: 10 TABLET | Refills: 0 | Status: SHIPPED | OUTPATIENT
Start: 2024-10-02

## 2024-10-02 RX ORDER — BUDESONIDE 0.5 MG/2ML
0.5 INHALANT ORAL
Status: DISCONTINUED | OUTPATIENT
Start: 2024-10-02 | End: 2024-10-03 | Stop reason: HOSPADM

## 2024-10-02 RX ORDER — ARFORMOTEROL TARTRATE 15 UG/2ML
15 SOLUTION RESPIRATORY (INHALATION)
Status: DISCONTINUED | OUTPATIENT
Start: 2024-10-02 | End: 2024-10-03 | Stop reason: HOSPADM

## 2024-10-02 RX ORDER — SPIRONOLACTONE 25 MG/1
25 TABLET ORAL DAILY
Status: DISCONTINUED | OUTPATIENT
Start: 2024-10-02 | End: 2024-10-03 | Stop reason: HOSPADM

## 2024-10-02 RX ORDER — CLOPIDOGREL BISULFATE 75 MG/1
75 TABLET ORAL DAILY
Status: DISCONTINUED | OUTPATIENT
Start: 2024-10-03 | End: 2024-10-03 | Stop reason: HOSPADM

## 2024-10-02 RX ORDER — IPRATROPIUM BROMIDE AND ALBUTEROL SULFATE 2.5; .5 MG/3ML; MG/3ML
1 SOLUTION RESPIRATORY (INHALATION)
Status: DISCONTINUED | OUTPATIENT
Start: 2024-10-02 | End: 2024-10-03 | Stop reason: HOSPADM

## 2024-10-02 RX ORDER — FUROSEMIDE 10 MG/ML
40 INJECTION INTRAMUSCULAR; INTRAVENOUS ONCE
Status: COMPLETED | OUTPATIENT
Start: 2024-10-02 | End: 2024-10-02

## 2024-10-02 RX ADMIN — SUCRALFATE 1 G: 1 TABLET ORAL at 17:21

## 2024-10-02 RX ADMIN — PANTOPRAZOLE SODIUM 40 MG: 40 TABLET, DELAYED RELEASE ORAL at 06:52

## 2024-10-02 RX ADMIN — LISINOPRIL 5 MG: 5 TABLET ORAL at 07:02

## 2024-10-02 RX ADMIN — DOXYCYCLINE HYCLATE 100 MG: 100 CAPSULE ORAL at 22:36

## 2024-10-02 RX ADMIN — NITROGLYCERIN 1 INCH: 20 OINTMENT TOPICAL at 06:07

## 2024-10-02 RX ADMIN — SODIUM CHLORIDE, PRESERVATIVE FREE 10 ML: 5 INJECTION INTRAVENOUS at 22:37

## 2024-10-02 RX ADMIN — ASPIRIN 81 MG 81 MG: 81 TABLET ORAL at 08:51

## 2024-10-02 RX ADMIN — MULTIVITAMIN TABLET 1 TABLET: TABLET at 08:50

## 2024-10-02 RX ADMIN — SUCRALFATE 1 G: 1 TABLET ORAL at 08:51

## 2024-10-02 RX ADMIN — METOPROLOL SUCCINATE 25 MG: 25 TABLET, EXTENDED RELEASE ORAL at 08:51

## 2024-10-02 RX ADMIN — IPRATROPIUM BROMIDE AND ALBUTEROL SULFATE 1 DOSE: 2.5; .5 SOLUTION RESPIRATORY (INHALATION) at 16:35

## 2024-10-02 RX ADMIN — ISOSORBIDE MONONITRATE 30 MG: 30 TABLET, EXTENDED RELEASE ORAL at 12:04

## 2024-10-02 RX ADMIN — FOLIC ACID 1 MG: 1 TABLET ORAL at 08:51

## 2024-10-02 RX ADMIN — THIAMINE HYDROCHLORIDE 100 MG: 100 INJECTION, SOLUTION INTRAMUSCULAR; INTRAVENOUS at 08:52

## 2024-10-02 RX ADMIN — WATER 40 MG: 1 INJECTION INTRAMUSCULAR; INTRAVENOUS; SUBCUTANEOUS at 12:04

## 2024-10-02 RX ADMIN — ROSUVASTATIN 40 MG: 20 TABLET, FILM COATED ORAL at 22:36

## 2024-10-02 RX ADMIN — DOXYCYCLINE HYCLATE 100 MG: 100 CAPSULE ORAL at 08:51

## 2024-10-02 RX ADMIN — CLOPIDOGREL BISULFATE 300 MG: 300 TABLET, FILM COATED ORAL at 12:03

## 2024-10-02 RX ADMIN — SPIRONOLACTONE 25 MG: 25 TABLET ORAL at 12:04

## 2024-10-02 RX ADMIN — WATER 40 MG: 1 INJECTION INTRAMUSCULAR; INTRAVENOUS; SUBCUTANEOUS at 22:36

## 2024-10-02 RX ADMIN — ENOXAPARIN SODIUM 40 MG: 100 INJECTION SUBCUTANEOUS at 17:21

## 2024-10-02 RX ADMIN — IPRATROPIUM BROMIDE AND ALBUTEROL SULFATE 1 DOSE: 2.5; .5 SOLUTION RESPIRATORY (INHALATION) at 19:49

## 2024-10-02 RX ADMIN — BUDESONIDE 500 MCG: 0.5 SUSPENSION RESPIRATORY (INHALATION) at 19:50

## 2024-10-02 RX ADMIN — SUCRALFATE 1 G: 1 TABLET ORAL at 12:05

## 2024-10-02 RX ADMIN — SODIUM CHLORIDE, PRESERVATIVE FREE 10 ML: 5 INJECTION INTRAVENOUS at 08:53

## 2024-10-02 RX ADMIN — TICAGRELOR 90 MG: 90 TABLET ORAL at 08:51

## 2024-10-02 RX ADMIN — SUCRALFATE 1 G: 1 TABLET ORAL at 22:37

## 2024-10-02 RX ADMIN — FUROSEMIDE 40 MG: 10 INJECTION, SOLUTION INTRAMUSCULAR; INTRAVENOUS at 12:36

## 2024-10-02 RX ADMIN — PANTOPRAZOLE SODIUM 40 MG: 40 TABLET, DELAYED RELEASE ORAL at 17:21

## 2024-10-02 RX ADMIN — ARFORMOTEROL TARTRATE 15 MCG: 15 SOLUTION RESPIRATORY (INHALATION) at 19:50

## 2024-10-02 ASSESSMENT — PAIN SCALES - GENERAL
PAINLEVEL_OUTOF10: 0
PAINLEVEL_OUTOF10: 1
PAINLEVEL_OUTOF10: 0

## 2024-10-02 ASSESSMENT — PAIN DESCRIPTION - DESCRIPTORS: DESCRIPTORS: DULL;ACHING

## 2024-10-02 ASSESSMENT — PAIN - FUNCTIONAL ASSESSMENT: PAIN_FUNCTIONAL_ASSESSMENT: ACTIVITIES ARE NOT PREVENTED

## 2024-10-02 ASSESSMENT — PAIN DESCRIPTION - ORIENTATION: ORIENTATION: MID

## 2024-10-02 ASSESSMENT — PAIN DESCRIPTION - LOCATION: LOCATION: CHEST

## 2024-10-02 NOTE — PROGRESS NOTES
infarction) (HCC)  Resolved Problems:    * No resolved hospital problems. *      Plan:    Acute anterior STEMI  S/p cardiac catheterization and PCI TONY LAD.  On aspirin and Plavix.   Cardiology following-optimize medical management as per cardiology.  Echocardiogram-EF 35%, hypokinesis of the apex, the interventricular septum, the anteroseptal wall and apical inferior wall.    Hypertensive emergency  Presented with elevated blood pressure and acute STEMI.  Blood pressure has improved but remains above goal.  Imdur, lisinopril, Toprol-XL, spironolactone-monitor blood pressure and adjust medications as needed.    COPD exacerbation.  Nicotine dependence.  On IV steroids.  Nebs.  Pulmonary consulted.  Discussed importance of smoking cessation, he understands, he is requesting nicotine patch at discharge.    DVT prophylaxis  Lovenox    NOTE: This report was transcribed using voice recognition software. Every effort was made to ensure accuracy; however, inadvertent computerized transcription errors may be present.  Electronically signed by Janneth Cordova MD on 10/2/2024 at 1:36 PM

## 2024-10-02 NOTE — CONSULTS
Met with patient and discussed that their physician has ordered a referral to our outpatient Phase II Cardiac Rehabilitation program. Reviewed the benefits of cardiac rehabilitation based on their diagnosis and personal risk factors. Patient demonstrates strong interest in Cardiac Rehabilitation at this time. Cardiac Rehabilitation brochure provided to patient/family. The Cardiac Rehabilitation Program has been provided the patient's referral information and pertinent patient details and history. The patient may call [facility] at [phone] for additional information or questions. Contact information for [facility] and other choices close to the patient's residence have been provided in the discharge instructions. Thank you for the referral.

## 2024-10-02 NOTE — CARE COORDINATION
10/02/24, Patient admitted for STEMI.  SW met with patient in room to discuss transition of care/SW role.  Patient lives alone in a 2 story home with 3 steps to enter the home.  Patient resides mostly on the 1st floor.  Patient says he is having a railing placed with a company but could not remember if [employer].  DME owned is none-patient is independent with ADL's.  HHC yes but could not remember company and LB hx no.  PCP patient is in process of switching to [facility] [employer].  Pharmacy is [facility].  Discharge plan will be home.  Patient says he could walk since he lives nearby or have family for transport.  SW to follow.      Case Management Assessment  Initial Evaluation    Date/Time of Evaluation: 10/2/2024 3:32 PM  Assessment Completed by: WILLIE De La Cruz    If patient is discharged prior to next notation, then this note serves as note for discharge by case management.    Patient Name: Erik Martinez                   YOB: 1950  Diagnosis: STEMI (ST elevation myocardial infarction) (HCC) [I21.3]  ST elevation myocardial infarction (STEMI), unspecified artery (HCC) [I21.3]                   Date / Time: 10/1/2024  3:42 PM    Patient Admission Status: Inpatient   Readmission Risk (Low < 19, Mod (19-27), High > 27): Readmission Risk Score: 10.3    Current PCP: No primary care provider on file.  PCP verified by ? Yes    Chart Reviewed: Yes      History Provided by: Patient  Patient Orientation: Alert and Oriented    Patient Cognition: Alert    Hospitalization in the last 30 days (Readmission):  No    If yes, Readmission Assessment in  Navigator will be completed.    Advance Directives:      Code Status: Full Code   Patient's Primary Decision Maker is:        Discharge Planning:    Patient lives with: Alone Type of Home: House  Primary Care Giver: Self  Patient Support Systems include: Family Members   Current Financial resources:    Current community resources:    Current  services prior to admission: None            Current DME:              Type of Home Care services:  None    ADLS  Prior functional level: Independent in ADLs/IADLs  Current functional level: Independent in ADLs/IADLs    PT AM-PAC:   /24  OT AM-PAC:   /24    Family can provide assistance at DC: Yes  Would you like Case Management to discuss the discharge plan with any other family members/significant others, and if so, who? No  Plans to Return to Present Housing: Yes  Other Identified Issues/Barriers to RETURNING to current housing: N/A  Potential Assistance needed at discharge: N/A            Potential DME:    Patient expects to discharge to: House  Plan for transportation at discharge:      Financial    Payor: MEDICAID [place] / Plan: MEDICAID [facility] DEPT OF JOB / Product Type: *No Product type* /     Does insurance require precert for SNF: No    Potential assistance Purchasing Medications:    Meds-to-Beds request: Yes      [facility] - [place], [place] - [address] - P [phone] - F [phone]  [address] [place], [place] [address].  [place] [address]  Phone: [phone] Fax: [phone]    [facility] [address] - P [phone] - F [phone]  [address]  [place] [address]  Phone: [phone] Fax: [phone]      Notes:    Factors facilitating achievement of predicted outcomes: Family support    Barriers to discharge: N/A    Additional Case Management Notes: See Above    The Plan for Transition of Care is related to the following treatment goals of STEMI (ST elevation myocardial infarction) (HCC) [I21.3]  ST elevation myocardial infarction (STEMI), unspecified artery (HCC) [I21.3]    IF APPLICABLE: The Patient and/or patient representative [place] and his family were provided with a choice of provider and agrees with the discharge plan. Freedom of choice list with basic dialogue that supports the patient's individualized plan of care/goals and shares the quality

## 2024-10-02 NOTE — CONSULTS
tablet Oral Daily    folic acid  1 mg Oral Daily    thiamine  100 mg IntraVENous Daily    doxycycline hyclate  100 mg Oral 2 times per day       Continuous Infusions:   sodium chloride      sodium chloride         No Known Allergies    REVIEW OF SYSTEMS:  Constitutional: Denies fever, weight loss, night sweats, and fatigue  Skin: Denies pigmentation, dark lesions, and rashes   HEENT: Denies hearing loss, tinnitus, ear drainage, epistaxis, sore throat, and hoarseness.  Cardiovascular: Denies palpitations, chest pain, and chest pressure.  Respiratory: Denies cough, dyspnea at rest, hemoptysis, apnea, and choking.  Gastrointestinal: Denies nausea, vomiting, poor appetite, diarrhea, heartburn or reflux  Genitourinary: Denies dysuria, frequency, urgency or hematuria  Musculoskeletal: Denies myalgias, muscle weakness, and bone pain  Neurological: Denies dizziness, vertigo, headache, and focal weakness  Psychological: Denies anxiety and depression  Endocrine: Denies heat intolerance and cold intolerance  Hematopoietic/Lymphatic: Denies bleeding problems and blood transfusions    OBJECTIVE:   BP (!) 181/117   Pulse 85   Temp 97.2 °F (36.2 °C) (Temporal)   Resp 18   Wt 59 kg (130 lb)   SpO2 96%   BMI 20.67 kg/m²   SpO2 Readings from Last 1 Encounters:   10/02/24 96%        I/O:    Intake/Output Summary (Last 24 hours) at 10/2/2024 1344  Last data filed at 10/2/2024 0901  Gross per 24 hour   Intake 394.33 ml   Output 1510 ml   Net -1115.67 ml                      Physical Exam:  General: The patient is lying in bed comfortably without any distress.  Breathing is not labored  HEENT: Pupils are equal round and reactive to light, there are no oral lesions and no post-nasal drip   Neck: supple without adenopathy  Cardiovascular: regular rate and rhythm without murmur or gallop  Respiratory: Diminished to auscultation bilaterally without wheezing or crackles.  Air entry is symmetric  Abdomen: soft, non-tender, non-distended,        Micro:  No results for input(s): \"CULTRESP\" in the last 72 hours.  No results for input(s): \"LABGRAM\" in the last 72 hours.  No results for input(s): \"LEGUR\" in the last 72 hours.  No results for input(s): \"STREPNEUMAGU\" in the last 72 hours.  No results for input(s): \"LP1UAG\" in the last 72 hours.      Assessment:  Acute anterior STEMI, 10/1/2024 CAD. Left heart catheterization 10/1/2024 drug-eluting stent to LAD  Advanced COPD with exacerbation  Centrilobular emphysema with areas of bronchiectasis  Hypertensive emergency blood pressure elevated with acute MI  Ongoing nicotine dependence 50 pack years  History of mediastinal lymphadenopathy underwent EBUS bronchoscopy 2021, cytology path negative for malignant cells    Plan:  Scheduled bronchodilators-Brovana, Pulmicort, DuoNebs. Trelegy for dc  IV Solu-Medrol 40 mg IV every 12 hours  Check Pro-Escobar, respiratory culture, respiratory viral panel, CRP, strep Legionella urine antigen pending  Doxycycline empiric CAP coverage/COPD exacerbation  Cxr pending ordered x 2  Tobacco cessation counseling  DVT, GI prophylaxis  Offer nicoderm patch   Pfts, lung cancer screening ct chest low dose if agreeable as OP message routed    Thank you for allowing me to participate in the care of Erik Martinez.   Please feel free to call with questions.     This plan of care was reviewed in collaboration with Dr. Fishman    Electronically signed by SEEMA Lyon CNP on 10/2/2024 at 1:44 PM      Note: This report was completed utilizing computer voice recognition software. Every effort has been made to ensure accuracy, however; inadvertent computerized transcription errors may be present      I personally saw, examined, and cared for the patient. I performed the substantive portion of the visit. Labs, medications, radiographs reviewed. I agree with history exam and plans detailed in NP note.    Patient known to service. Hasn't followed up. Significant tobacco use.  +anterior

## 2024-10-02 NOTE — PROGRESS NOTES
Vasc band weaned per protocol, site stable, right radial site cleaned w/alcoh pads and dressed w/folded 2x2  and covered w/opsite. Pt instructed on right wrist limitations, understanding verbalized.

## 2024-10-02 NOTE — PROGRESS NOTES
Inpatient Cardiology Progress note     PATIENT IS BEING FOLLOWED FOR: Acute anterior STEMI    Erik Martinez is a 73 y.o. male seen in initial consultation this admission by me 10/1/2024    73-year-old male with hypertension and tobacco abuse.  Presented to the ED 10/1/2024 in the afternoon with chest pain that reportedly started an hour prior to presentation.  EKG showed sinus rhythm with ST elevation in the anterior leads and with reciprocal changes in the inferior and lateral leads consistent with acute anterior ST elevation myocardial infarction.  The patient received aspirin, sublingual nitroglycerin and heparin bolus.  Interventional cardiology was contacted.  The Cath Lab was activated.  En route to the Cath Lab, he was started on IV nitroglycerin.  On arrival to the Cath Lab he was given 180 mg of crushed Brilinta to swallow.  He did receive morphine in the ED.  He was also given IV fentanyl and IV Versed on arrival to the Cath Lab.     10/1/2024 cardiac catheterization plus PCI/stent to proximal LAD (see below)     SUBJECTIVE: Denies chest pain or shortness of breath  OBJECTIVE: Ambulating in the room in no distress.  Adolescent/adult grand kids by bedside    ROS:  Consist: Denies fevers, chills or night sweats  Heart: Denies chest pain, palpitations, lightheadedness, dizziness or syncope  Lungs: Denies SOB, cough, wheezing, orthopnea or PND  GI: Denies abdominal pain, vomiting or diarrhea    PHYSICAL EXAM:   BP (!) 181/117   Pulse 85   Temp 97.2 °F (36.2 °C) (Temporal)   Resp 18   Wt 59 kg (130 lb)   SpO2 96%   BMI 20.67 kg/m²    B/P Range last 24 hours: Systolic (24hrs), Av , Min:137 , Max:200    Diastolic (24hrs), Av, Min:103, Max:137    CONST: Well developed, well nourished male who appears of stated age. Awake, alert and cooperative. No apparent distress  HEENT:   Head- Normocephalic, atraumatic   Eyes- Conjunctivae pink, anicteric  Throat- Oral mucosa pink and moist  Neck-  No stridor,  trachea midline, no jugular venous distention. No carotid bruit  CHEST: Chest symmetrical and non-tender to palpation. No accessory muscle use or intercostal retractions  RESPIRATORY:  Lung sounds -coarse breath sounds especially in the lower lung fields  CARDIOVASCULAR:     Heart Inspection- shows no noted pulsations  Heart Palpation- no heaves or thrills; PMI is non-displaced   Heart Ausculation- Regular rate and rhythm, no murmur. No s3, s4 or rub   PV: No lower extremity edema. No varicosities. Pedal pulses palpable, no clubbing or cyanosis.  Right radial exercise without hematoma and with preserved pulse  ABDOMEN: Soft, non-tender to light palpation. Bowel sounds present. No palpable masses no organomegaly; no abdominal bruit  MS: Good muscle strength and tone. No atrophy or abnormal movements.   : Deferred  SKIN: Warm and dry no statis dermatitis or ulcers   NEURO / PSYCH: Oriented to person, place and time. Speech clear and appropriate. Follows all commands. Pleasant affect       Intake/Output Summary (Last 24 hours) at 10/2/2024 1120  Last data filed at 10/2/2024 0901  Gross per 24 hour   Intake 394.33 ml   Output 1510 ml   Net -1115.67 ml       Weight:   Wt Readings from Last 3 Encounters:   10/01/24 59 kg (130 lb)   09/03/21 71.7 kg (158 lb)   08/04/21 63.5 kg (140 lb)     Current Inpatient Medications:   [START ON 10/3/2024] lisinopril  10 mg Oral Daily    [START ON 10/3/2024] metoprolol succinate  50 mg Oral Daily    isosorbide mononitrate  30 mg Oral Daily    sodium chloride flush  5-40 mL IntraVENous 2 times per day    sodium chloride flush  5-40 mL IntraVENous 2 times per day    rosuvastatin  40 mg Oral Nightly    aspirin  81 mg Oral Daily    ticagrelor  90 mg Oral BID    sucralfate  1 g Oral 4x Daily    pantoprazole  40 mg Oral BID AC    multivitamin  1 tablet Oral Daily    folic acid  1 mg Oral Daily    thiamine  100 mg IntraVENous Daily    doxycycline hyclate  100 mg Oral 2 times per day       IV

## 2024-10-03 VITALS
DIASTOLIC BLOOD PRESSURE: 74 MMHG | WEIGHT: 130 LBS | TEMPERATURE: 97 F | HEIGHT: 66 IN | OXYGEN SATURATION: 97 % | SYSTOLIC BLOOD PRESSURE: 145 MMHG | BODY MASS INDEX: 20.89 KG/M2 | HEART RATE: 70 BPM | RESPIRATION RATE: 16 BRPM

## 2024-10-03 LAB
ANION GAP SERPL CALCULATED.3IONS-SCNC: 12 MMOL/L (ref 7–16)
BASOPHILS # BLD: 0.02 K/UL (ref 0–0.2)
BASOPHILS NFR BLD: 0 % (ref 0–2)
BUN SERPL-MCNC: 20 MG/DL (ref 6–23)
CALCIUM SERPL-MCNC: 9.8 MG/DL (ref 8.6–10.2)
CHLORIDE SERPL-SCNC: 100 MMOL/L (ref 98–107)
CO2 SERPL-SCNC: 24 MMOL/L (ref 22–29)
CREAT SERPL-MCNC: 0.9 MG/DL (ref 0.7–1.2)
EOSINOPHIL # BLD: 0.01 K/UL (ref 0.05–0.5)
EOSINOPHILS RELATIVE PERCENT: 0 % (ref 0–6)
ERYTHROCYTE [DISTWIDTH] IN BLOOD BY AUTOMATED COUNT: 13.9 % (ref 11.5–15)
GFR, ESTIMATED: 85 ML/MIN/1.73M2
GLUCOSE SERPL-MCNC: 168 MG/DL (ref 74–99)
HCT VFR BLD AUTO: 38 % (ref 37–54)
HGB BLD-MCNC: 11.6 G/DL (ref 12.5–16.5)
IMM GRANULOCYTES # BLD AUTO: 0.05 K/UL (ref 0–0.58)
IMM GRANULOCYTES NFR BLD: 0 % (ref 0–5)
LYMPHOCYTES NFR BLD: 1.05 K/UL (ref 1.5–4)
LYMPHOCYTES RELATIVE PERCENT: 9 % (ref 20–42)
MCH RBC QN AUTO: 23.3 PG (ref 26–35)
MCHC RBC AUTO-ENTMCNC: 30.5 G/DL (ref 32–34.5)
MCV RBC AUTO: 76.3 FL (ref 80–99.9)
MONOCYTES NFR BLD: 0.27 K/UL (ref 0.1–0.95)
MONOCYTES NFR BLD: 2 % (ref 2–12)
NEUTROPHILS NFR BLD: 89 % (ref 43–80)
NEUTS SEG NFR BLD: 10.82 K/UL (ref 1.8–7.3)
PLATELET # BLD AUTO: 236 K/UL (ref 130–450)
PMV BLD AUTO: 12.4 FL (ref 7–12)
POTASSIUM SERPL-SCNC: 4 MMOL/L (ref 3.5–5)
RBC # BLD AUTO: 4.98 M/UL (ref 3.8–5.8)
SODIUM SERPL-SCNC: 136 MMOL/L (ref 132–146)
WBC OTHER # BLD: 12.2 K/UL (ref 4.5–11.5)

## 2024-10-03 PROCEDURE — 99232 SBSQ HOSP IP/OBS MODERATE 35: CPT | Performed by: INTERNAL MEDICINE

## 2024-10-03 PROCEDURE — 80048 BASIC METABOLIC PNL TOTAL CA: CPT

## 2024-10-03 PROCEDURE — 6370000000 HC RX 637 (ALT 250 FOR IP): Performed by: NURSE PRACTITIONER

## 2024-10-03 PROCEDURE — 85025 COMPLETE CBC W/AUTO DIFF WBC: CPT

## 2024-10-03 PROCEDURE — 6360000002 HC RX W HCPCS: Performed by: NURSE PRACTITIONER

## 2024-10-03 PROCEDURE — 36415 COLL VENOUS BLD VENIPUNCTURE: CPT

## 2024-10-03 PROCEDURE — 94640 AIRWAY INHALATION TREATMENT: CPT

## 2024-10-03 PROCEDURE — 99239 HOSP IP/OBS DSCHRG MGMT >30: CPT | Performed by: INTERNAL MEDICINE

## 2024-10-03 PROCEDURE — 6370000000 HC RX 637 (ALT 250 FOR IP): Performed by: INTERNAL MEDICINE

## 2024-10-03 RX ORDER — ROSUVASTATIN CALCIUM 40 MG/1
40 TABLET, COATED ORAL NIGHTLY
Qty: 30 TABLET | Refills: 3 | Status: SHIPPED | OUTPATIENT
Start: 2024-10-03

## 2024-10-03 RX ORDER — PANTOPRAZOLE SODIUM 40 MG/1
40 TABLET, DELAYED RELEASE ORAL
Qty: 180 TABLET | Refills: 1 | Status: SHIPPED | OUTPATIENT
Start: 2024-10-03

## 2024-10-03 RX ORDER — SPIRONOLACTONE 25 MG/1
25 TABLET ORAL DAILY
Qty: 30 TABLET | Refills: 3 | Status: SHIPPED | OUTPATIENT
Start: 2024-10-04

## 2024-10-03 RX ORDER — METOPROLOL SUCCINATE 50 MG/1
50 TABLET, EXTENDED RELEASE ORAL DAILY
Qty: 30 TABLET | Refills: 3 | Status: SHIPPED | OUTPATIENT
Start: 2024-10-04

## 2024-10-03 RX ORDER — ISOSORBIDE MONONITRATE 30 MG/1
30 TABLET, EXTENDED RELEASE ORAL DAILY
Qty: 30 TABLET | Refills: 3 | Status: SHIPPED | OUTPATIENT
Start: 2024-10-03

## 2024-10-03 RX ORDER — ASPIRIN 81 MG/1
81 TABLET, CHEWABLE ORAL DAILY
Qty: 30 TABLET | Refills: 3 | Status: SHIPPED | OUTPATIENT
Start: 2024-10-04

## 2024-10-03 RX ORDER — PREDNISONE 20 MG/1
40 TABLET ORAL DAILY
Status: DISCONTINUED | OUTPATIENT
Start: 2024-10-03 | End: 2024-10-03 | Stop reason: HOSPADM

## 2024-10-03 RX ORDER — FOLIC ACID 1 MG/1
1 TABLET ORAL DAILY
Qty: 30 TABLET | Refills: 0 | Status: SHIPPED | OUTPATIENT
Start: 2024-10-03

## 2024-10-03 RX ORDER — MULTIVITAMIN WITH IRON
1 TABLET ORAL DAILY
Qty: 30 TABLET | Refills: 1 | Status: SHIPPED | OUTPATIENT
Start: 2024-10-03

## 2024-10-03 RX ORDER — CLOPIDOGREL BISULFATE 75 MG/1
75 TABLET ORAL DAILY
Qty: 30 TABLET | Refills: 3 | Status: SHIPPED | OUTPATIENT
Start: 2024-10-04

## 2024-10-03 RX ORDER — LISINOPRIL 10 MG/1
10 TABLET ORAL DAILY
Qty: 30 TABLET | Refills: 3 | Status: SHIPPED | OUTPATIENT
Start: 2024-10-03

## 2024-10-03 RX ADMIN — IPRATROPIUM BROMIDE AND ALBUTEROL SULFATE 1 DOSE: 2.5; .5 SOLUTION RESPIRATORY (INHALATION) at 09:11

## 2024-10-03 RX ADMIN — ISOSORBIDE MONONITRATE 30 MG: 30 TABLET, EXTENDED RELEASE ORAL at 11:36

## 2024-10-03 RX ADMIN — SPIRONOLACTONE 25 MG: 25 TABLET ORAL at 08:37

## 2024-10-03 RX ADMIN — BUDESONIDE 500 MCG: 0.5 SUSPENSION RESPIRATORY (INHALATION) at 09:10

## 2024-10-03 RX ADMIN — DOXYCYCLINE HYCLATE 100 MG: 100 CAPSULE ORAL at 08:37

## 2024-10-03 RX ADMIN — FOLIC ACID 1 MG: 1 TABLET ORAL at 08:37

## 2024-10-03 RX ADMIN — PREDNISONE 40 MG: 20 TABLET ORAL at 11:36

## 2024-10-03 RX ADMIN — PANTOPRAZOLE SODIUM 40 MG: 40 TABLET, DELAYED RELEASE ORAL at 06:06

## 2024-10-03 RX ADMIN — LISINOPRIL 10 MG: 5 TABLET ORAL at 11:36

## 2024-10-03 RX ADMIN — SUCRALFATE 1 G: 1 TABLET ORAL at 08:38

## 2024-10-03 RX ADMIN — ARFORMOTEROL TARTRATE 15 MCG: 15 SOLUTION RESPIRATORY (INHALATION) at 09:10

## 2024-10-03 RX ADMIN — MULTIVITAMIN TABLET 1 TABLET: TABLET at 08:38

## 2024-10-03 RX ADMIN — ASPIRIN 81 MG 81 MG: 81 TABLET ORAL at 08:38

## 2024-10-03 RX ADMIN — METOPROLOL SUCCINATE 50 MG: 25 TABLET, EXTENDED RELEASE ORAL at 08:37

## 2024-10-03 RX ADMIN — CLOPIDOGREL BISULFATE 75 MG: 75 TABLET ORAL at 08:37

## 2024-10-03 ASSESSMENT — PAIN SCALES - GENERAL
PAINLEVEL_OUTOF10: 0

## 2024-10-03 NOTE — PROGRESS NOTES
Monitor dcd cleaned and placed in slot in nurses station. Post cardiac cath wrist instructions reviewed with patient and handout given. Info given on cardiac diet. Meds reviewed with patient and the importance of taking aspirin and plavix as ordered stressed to patient. Refused wheelchair and left with all belongings that were documented that he came in with.

## 2024-10-03 NOTE — PROGRESS NOTES
CLINICAL PHARMACY NOTE: MEDS TO BEDS    Total # of Prescriptions Filled:  11   The following medications were delivered to the patient:  Pantoprazole sodium 40 mg  Aspirin low dose 81 mg chewable  Folic acid 1 mg  Clopidogrel bisulfate 75 mg  Metoprolol succinate 50 mg  Spironolactone 25 mg  Lisinopril 10 mg  Isosorbide mononitrate 30 mg  Therems  Nicotine 7 mg patch  Rosuvastatin calcium 40 mg    Additional Documentation:   Neighbor picked up in the pharmacy

## 2024-10-03 NOTE — PROGRESS NOTES
CLINICAL PHARMACY NOTE: MEDS TO BEDS    Total # of Prescriptions Filled: 2   The following medications were delivered to the patient:  Prednisone 10mg  Trelegy 100-62.5-25 inhaler    Additional Documentation:  Delivered to patient 10-3-24 @10:30am delivery

## 2024-10-03 NOTE — PROGRESS NOTES
Inpatient Cardiology Progress note     PATIENT IS BEING FOLLOWED FOR: Acute anterior STEMI    Erik Martinez is a 73 y.o. male seen in initial consultation this admission by me 10/1/2024    73-year-old male with hypertension and tobacco abuse.  Presented to the ED 10/1/2024 in the afternoon with chest pain that reportedly started an hour prior to presentation.  EKG showed sinus rhythm with ST elevation in the anterior leads and with reciprocal changes in the inferior and lateral leads consistent with acute anterior ST elevation myocardial infarction.  The patient received aspirin, sublingual nitroglycerin and heparin bolus.  Interventional cardiology was contacted.  The Cath Lab was activated.  En route to the Cath Lab, he was started on IV nitroglycerin.  On arrival to the Cath Lab he was given 180 mg of crushed Brilinta to swallow.  He did receive morphine in the ED.  He was also given IV fentanyl and IV Versed on arrival to the Cath Lab.     10/1/2024 cardiac catheterization plus PCI/stent to proximal LAD (see below)     SUBJECTIVE: Denies chest pain or shortness of breath  OBJECTIVE: Ambulating in the room in no distress.     ROS:  Consist: Denies fevers, chills or night sweats  Heart: Denies chest pain, palpitations, lightheadedness, dizziness or syncope  Lungs: Denies SOB, cough, wheezing, orthopnea or PND  GI: Denies abdominal pain, vomiting or diarrhea    PHYSICAL EXAM:   /79   Pulse 84   Temp 97 °F (36.1 °C) (Temporal)   Resp 16   Ht 1.676 m (5' 6\")   Wt 59 kg (130 lb)   SpO2 97%   BMI 20.98 kg/m²    B/P Range last 24 hours: Systolic (24hrs), Av , Min:105 , Max:181    Diastolic (24hrs), Av, Min:79, Max:117    CONST: Well developed, well nourished male who appears of stated age. Awake, alert and cooperative. No apparent distress  HEENT:   Head- Normocephalic, atraumatic   Eyes- Conjunctivae pink, anicteric  Throat- Oral mucosa pink and moist  Neck-  No stridor, trachea midline, no

## 2024-10-04 ENCOUNTER — CARE COORDINATION (OUTPATIENT)
Dept: CARE COORDINATION | Age: 74
End: 2024-10-04

## 2024-10-04 NOTE — CARE COORDINATION
Care Transitions Note    Initial Call - Call within 2 business days of discharge: Yes    Attempted to reach patient for transitions of care follow up. Unable to reach patient. Number listed for patient was \"wrong number\". Attempted alternative contacts for akil Partida. Numbers were invalid. Attempted to contact PCP office 3 times. Kept receiving \"error\" message when ttransferred. Pt not currently a [facility] PCP patient, has new pt appt with SAUL parker on 10/16/24. CTN sign off for transitions.     Outreach Attempts:   Multiple attempts to contact patient, family, akil Cash and Gigi at phone numbers on file.     Patient: Erik Martinez    Patient : 1950   MRN: [id number]    Reason for Admission: STEMI  Discharge Date: 10/3/24  RURS: Readmission Risk Score: 13.4    Last Discharge Facility       Date Complaint Diagnosis Description Type Department Provider    10/1/24 Chest Pain ST elevation myocardial infarction (STEMI), unspecified artery (HCC) ... ED to Hosp-Admission (Discharged) (ADMIT) Janneth Dawkins MD; Lilliana, ...            Was this an external facility discharge? No    Follow Up Appointment:   Patient has hospital follow up appointment scheduled within 14 days of discharge.    Future Appointments         Provider Specialty Dept Phone    10/16/2024 2:40 PM Fozia Pablo MD Family Medicine [phone]            No further follow-up call indicated     Greta Francois RN

## 2024-10-05 LAB
EKG ATRIAL RATE: 82 BPM
EKG P AXIS: 82 DEGREES
EKG P-R INTERVAL: 204 MS
EKG Q-T INTERVAL: 380 MS
EKG QRS DURATION: 94 MS
EKG QTC CALCULATION (BAZETT): 443 MS
EKG R AXIS: 76 DEGREES
EKG T AXIS: 72 DEGREES
EKG VENTRICULAR RATE: 82 BPM

## 2024-11-11 NOTE — DISCHARGE SUMMARY
[facility] Hospitalist Physician Discharge Summary       Harpreet Nichole MD  [address] [facility] [address]  [phone]    Follow up in 4 week(s)      Cameron Pinto MD  [address]  [facility] [id number] [phone]    Follow up        Activity level: As tolerated     Dispo: Home    Condition on discharge: Stable     Patient ID:  Erik Martinez  [id number]  73 y.o.  1950    Admit date: 10/1/2024    Discharge date and time:  10/3/2024  9:56 AM    Admission Diagnoses: Principal Problem:    STEMI (ST elevation myocardial infarction) (HCC)  Resolved Problems:    * No resolved hospital problems. *      Discharge Diagnoses: Principal Problem:    STEMI (ST elevation myocardial infarction) (HCC)  Resolved Problems:    * No resolved hospital problems. *      Consults:  IP CONSULT TO INTERNAL MEDICINE  IP CONSULT TO CARDIAC REHAB  IP CONSULT TO CARDIAC REHAB  IP CONSULT TO PULMONOLOGY    Hospital Course:   Patient is a 73-year-old gentleman with past medical history of hypertension, nicotine dependence, prior history of alcohol use which she quit about 15 months ago, COPD.  He presented to ED with sudden onset severe burning left-sided chest pain, patient was taking shower and suddenly fence left-sided chest pain with shortness of breath and nausea.  He called EMS and was brought to ED.  EKG showed ST elevations in anterior septal and lateral leads.  Patient was taken to cardiac catheterization emergently, underwent PCI to LAD.  Blood pressure was elevated in ED, patient reports running out of medications.  On DAPT, meds optimized by cardiology, echocardiogram showing EF 35% with hypokinesis of the apex, the interventricular septum, the anteroseptal wall and apical inferior wall.  Blood pressure has improved-now on Imdur, lisinopril, Toprol-XL, spironolactone -tolerating well.  Seen by pulmonary for COPD, cleared for discharge by pulmonary on trelegy and oral steroids.  Encouarged smoking  regurgitation which appeared catheter induced 5.  Systemic hypertension 6.  LVEDP = 16 mmHg 7.  No gradient across aortic valve on pullback 8.  Successful balloon angioplasty with the deployment of a drug-eluting coronary stent to the very proximal LAD with dilatation of the stent post deployment with a larger high-pressure noncompliant balloon with very good results without any significant residual stenosis and with restoration of MARISABEL-3 flow in the vessel      Patient Instructions:      Medication List        START taking these medications      aspirin 81 MG chewable tablet  Take 1 tablet by mouth daily  Start taking on: October 4, 2024     clopidogrel 75 MG tablet  Commonly known as: PLAVIX  Take 1 tablet by mouth daily  Start taking on: October 4, 2024     isosorbide mononitrate 30 MG extended release tablet  Commonly known as: IMDUR  Take 1 tablet by mouth daily     metoprolol succinate 50 MG extended release tablet  Commonly known as: TOPROL XL  Take 1 tablet by mouth daily  Start taking on: October 4, 2024     nicotine 7 MG/24HR  Commonly known as: NICODERM CQ  Place 1 patch onto the skin daily for 14 days     predniSONE 10 MG tablet  Commonly known as: DELTASONE  Take 4 tabs daily x 3 days then 3 tabs daily x 3 days then 2 tabs daily x 3 days then 1 tab daily x 3 days then stop     rosuvastatin 40 MG tablet  Commonly known as: CRESTOR  Take 1 tablet by mouth nightly     spironolactone 25 MG tablet  Commonly known as: ALDACTONE  Take 1 tablet by mouth daily  Start taking on: October 4, 2024     Trelegy Ellipta 100-62.5-25 MCG/ACT Aepb inhaler  Generic drug: fluticasone-umeclidin-vilant  Inhale 1 puff into the lungs daily            CHANGE how you take these medications      lisinopril 10 MG tablet  Commonly known as: PRINIVIL;ZESTRIL  Take 1 tablet by mouth daily  What changed:   medication strength  how much to take            CONTINUE taking these medications      albuterol sulfate  (90 Base) MCG/ACT  declines

## 2024-12-04 ENCOUNTER — OFFICE VISIT (OUTPATIENT)
Dept: INTERNAL MEDICINE | Age: 74
End: 2024-12-04
Payer: MEDICARE

## 2024-12-04 VITALS
SYSTOLIC BLOOD PRESSURE: 178 MMHG | HEIGHT: 67 IN | BODY MASS INDEX: 25.05 KG/M2 | RESPIRATION RATE: 18 BRPM | DIASTOLIC BLOOD PRESSURE: 97 MMHG | TEMPERATURE: 97.2 F | HEART RATE: 78 BPM | WEIGHT: 159.6 LBS | OXYGEN SATURATION: 95 %

## 2024-12-04 DIAGNOSIS — I21.02 ST ELEVATION MYOCARDIAL INFARCTION INVOLVING LEFT ANTERIOR DESCENDING (LAD) CORONARY ARTERY (HCC): ICD-10-CM

## 2024-12-04 DIAGNOSIS — I50.20 HFREF (HEART FAILURE WITH REDUCED EJECTION FRACTION) (HCC): ICD-10-CM

## 2024-12-04 DIAGNOSIS — J44.9 CHRONIC OBSTRUCTIVE PULMONARY DISEASE, UNSPECIFIED COPD TYPE (HCC): ICD-10-CM

## 2024-12-04 DIAGNOSIS — Z72.0 TOBACCO ABUSE: ICD-10-CM

## 2024-12-04 DIAGNOSIS — I10 ESSENTIAL HYPERTENSION: Primary | ICD-10-CM

## 2024-12-04 LAB
BASOPHILS ABSOLUTE: 0.04 K/UL (ref 0–0.2)
BASOPHILS RELATIVE PERCENT: 0 % (ref 0–2)
EOSINOPHILS ABSOLUTE: 0.41 K/UL (ref 0.05–0.5)
EOSINOPHILS RELATIVE PERCENT: 4 % (ref 0–6)
FOLATE: >20 NG/ML (ref 4.8–24.2)
HCT VFR BLD CALC: 41.5 % (ref 37–54)
HEMOGLOBIN: 12.1 G/DL (ref 12.5–16.5)
IMMATURE GRANULOCYTES %: 0 % (ref 0–5)
IMMATURE GRANULOCYTES ABSOLUTE: 0.04 K/UL (ref 0–0.58)
LYMPHOCYTES ABSOLUTE: 3.02 K/UL (ref 1.5–4)
LYMPHOCYTES RELATIVE PERCENT: 31 % (ref 20–42)
MCH RBC QN AUTO: 23 PG (ref 26–35)
MCHC RBC AUTO-ENTMCNC: 29.2 G/DL (ref 32–34.5)
MCV RBC AUTO: 79 FL (ref 80–99.9)
MONOCYTES ABSOLUTE: 0.66 K/UL (ref 0.1–0.95)
MONOCYTES RELATIVE PERCENT: 7 % (ref 2–12)
NEUTROPHILS ABSOLUTE: 5.48 K/UL (ref 1.8–7.3)
NEUTROPHILS RELATIVE PERCENT: 57 % (ref 43–80)
PDW BLD-RTO: 13.7 % (ref 11.5–15)
PLATELET # BLD: 217 K/UL (ref 130–450)
PMV BLD AUTO: 11.9 FL (ref 7–12)
RBC # BLD: 5.25 M/UL (ref 3.8–5.8)
VITAMIN B-12: 523 PG/ML (ref 211–946)
WBC # BLD: 9.7 K/UL (ref 4.5–11.5)

## 2024-12-04 PROCEDURE — 99202 OFFICE O/P NEW SF 15 MIN: CPT

## 2024-12-04 PROCEDURE — 90653 IIV ADJUVANT VACCINE IM: CPT | Performed by: INTERNAL MEDICINE

## 2024-12-04 PROCEDURE — 36415 COLL VENOUS BLD VENIPUNCTURE: CPT

## 2024-12-04 RX ORDER — FOLIC ACID 1 MG/1
1 TABLET ORAL DAILY
Qty: 30 TABLET | Refills: 0 | Status: SHIPPED | OUTPATIENT
Start: 2024-12-04

## 2024-12-04 RX ORDER — MULTIVITAMIN WITH IRON
1 TABLET ORAL DAILY
Qty: 30 TABLET | Refills: 1 | Status: SHIPPED | OUTPATIENT
Start: 2024-12-04

## 2024-12-04 SDOH — ECONOMIC STABILITY: INCOME INSECURITY: HOW HARD IS IT FOR YOU TO PAY FOR THE VERY BASICS LIKE FOOD, HOUSING, MEDICAL CARE, AND HEATING?: NOT HARD AT ALL

## 2024-12-04 SDOH — ECONOMIC STABILITY: FOOD INSECURITY: WITHIN THE PAST 12 MONTHS, YOU WORRIED THAT YOUR FOOD WOULD RUN OUT BEFORE YOU GOT MONEY TO BUY MORE.: NEVER TRUE

## 2024-12-04 SDOH — ECONOMIC STABILITY: FOOD INSECURITY: WITHIN THE PAST 12 MONTHS, THE FOOD YOU BOUGHT JUST DIDN'T LAST AND YOU DIDN'T HAVE MONEY TO GET MORE.: NEVER TRUE

## 2024-12-04 ASSESSMENT — ENCOUNTER SYMPTOMS
RHINORRHEA: 0
RESPIRATORY NEGATIVE: 1
STRIDOR: 0
CHEST TIGHTNESS: 0
SORE THROAT: 0
CONSTIPATION: 0
SHORTNESS OF BREATH: 0
CHOKING: 0
APNEA: 0
SINUS PRESSURE: 0
GASTROINTESTINAL NEGATIVE: 1
ALLERGIC/IMMUNOLOGIC NEGATIVE: 1
ABDOMINAL PAIN: 0
SINUS PAIN: 0
DIARRHEA: 0
EYES NEGATIVE: 1
NAUSEA: 0
WHEEZING: 0

## 2024-12-04 ASSESSMENT — PATIENT HEALTH QUESTIONNAIRE - PHQ9
2. FEELING DOWN, DEPRESSED OR HOPELESS: NOT AT ALL
SUM OF ALL RESPONSES TO PHQ QUESTIONS 1-9: 0
1. LITTLE INTEREST OR PLEASURE IN DOING THINGS: NOT AT ALL
SUM OF ALL RESPONSES TO PHQ QUESTIONS 1-9: 0
SUM OF ALL RESPONSES TO PHQ9 QUESTIONS 1 & 2: 0

## 2024-12-04 NOTE — PROGRESS NOTES
[facility]  Internal Medicine Residency Clinic    Attending Physician Statement  I have discussed the case, including pertinent history and exam findings with the resident physician.I have seen and examined the patient and the key elements of the encounter have been performed by me. I agree with the assessment, plan and orders as documented by the resident. I have reviewed all pertinent PMHx, PSHx, FamHx, SocialHx, medications, and allergies and updated history as appropriate.    Patient here for new patient appointment to establish care.   Patient has hx HTN, tobacco abuse (patient rolls his own cigarettes), COPD, HFrEF, STEMI 10/24, had PCI with TONY to proximal LAD, currently on DAPT and statin. Had previous bx right of hilar mass in past () which was negative for malignancy. Also has hx prior ETOH abuse (states he quit drinking one year ago after his sister ). Labs from 10/2024 revealed mild neutrophilic leukocytosis and microcytic anemia. Agree with continued med rx, repeat labs, if still has microcytic indices need to obtain iron studies. Would also repeat CT chest.  Also needs repeat lipid panel.      Remainder of medical problems as per resident note.    Thom Jesus, DO  24    
Lab drawn per ordered 1 lavender and 1 gold tube sent to  lab per ordered. Flu vaccine was given per ordered without no distress noted VIS given Dr ordered pt to have a CT chest w/o contrast and a CMP was needed after lab drawn pt refused to allow us to get lab drawn for this specific test  
3/4/2025).      I have reviewed my findings and recommendations with Erik Martinez and Dr. Jesus.    Ross Zamora MD   12/4/2024 4:58 PM

## 2024-12-04 NOTE — PATIENT INSTRUCTIONS
medical card and request transportation at least 3 days in advance                           INDIVIDUALS WITH SOME MEDICARE ADVANTAGE PLANS OR MEDICARE AND MEDICAID PLANS ALSO HAVE TRANSPORTATION AVAILABLE: CALL THE MEMBER SERVICES NUMBER ON YOUR INSURANCE CARD AND INQUIRE IF HAVE TRANSPORTATION BENEFIT        Dear Erik Martinez,      Thank you for coming to your appointment today. I hope we have addressed all of your needs.     Please make sure to do the following:  - Continue your medications as listed.  - Get labs drawn before our next follow up.   - We will see each other again in 3 months    Call for a sooner appointment if you develop any worsening symptoms    Have a great day!      Sincerely,  Ross Zamora MD  12/4/2024  3:29 PM

## 2024-12-05 ENCOUNTER — TELEPHONE (OUTPATIENT)
Dept: INTERNAL MEDICINE | Age: 74
End: 2024-12-05

## 2024-12-05 DIAGNOSIS — D50.9 MICROCYTIC ANEMIA: Primary | ICD-10-CM

## 2024-12-05 NOTE — TELEPHONE ENCOUNTER
Attempted to call patient with listed number multiple times to discuss CBC results with potential plan to order iron studies. However, no answer.

## 2025-02-28 ENCOUNTER — TRANSCRIBE ORDERS (OUTPATIENT)
Dept: ADMINISTRATIVE | Age: 75
End: 2025-02-28

## 2025-02-28 DIAGNOSIS — Z12.2 ENCOUNTER FOR SCREENING FOR MALIGNANT NEOPLASM OF RESPIRATORY ORGANS: Primary | ICD-10-CM

## (undated) DEVICE — SET EXTN IV L30IN TBNG DIA0.1IN PRIMING 4ML MACBOR FEM ADPT

## (undated) DEVICE — GUIDEWIRE VASC L260CM 0.035IN J TIP L3MM PTFE FIX COR NAMIC

## (undated) DEVICE — SINGLE USE BIOPSY VALVE MAJ-210: Brand: SINGLE USE BIOPSY VALVE (STERILE)

## (undated) DEVICE — CONTAINER SPEC 60ML PH 7NEUTRAL BUFF FRMLN RDY TO USE

## (undated) DEVICE — SINGLE USE ASPIRATION NEEDLE: Brand: SINGLE USE ASPIRATION NEEDLE

## (undated) DEVICE — BAND COMPR L24CM REG CLR PLAS HEMSTAT EXT HK AND LOOP RETEN

## (undated) DEVICE — ANGIOPLASTY ADD-ON PACK: Brand: MEDLINE INDUSTRIES, INC.

## (undated) DEVICE — BITEBLOCK 54FR W/ DENT RIM BLOX

## (undated) DEVICE — CATH BLLN ANGIO 3X20MM SC EUPHORA RX

## (undated) DEVICE — SINGLE USE SUCTION VALVE MAJ-209: Brand: SINGLE USE SUCTION VALVE (STERILE)

## (undated) DEVICE — Device: Brand: MEDEX

## (undated) DEVICE — CATHETER GUID 6FR L100CM DIA0.071IN NYL SHFT JL3.5 W/ SIDE

## (undated) DEVICE — CATHETER DIAG 6FR L100CM LUMN ID0.056IN JR4 CRV 0 SIDE H

## (undated) DEVICE — Device

## (undated) DEVICE — KIT ANGIO W/ AT P65 PREM HND CTRL FOR CNTRST DEL ANGIOTOUCH

## (undated) DEVICE — GUIDEWIRE WITH ICE™ HYDROPHILIC COATING: Brand: CHOICE™

## (undated) DEVICE — CATHETER BLLN SPRINTER OTW 2MMX12MM 138CM

## (undated) DEVICE — Z DISCONTINUED NO SUB IDED TUBING ETCO2 AD L6.5FT NSL ORAL CVD PRNG NONFLARED TIP OVR

## (undated) DEVICE — CATH BLLN ANGIO 5X15MM NC EUPHORIA RX

## (undated) DEVICE — INFLATION DEVICE KIT: Brand: ENCORE™ 26 ADVANTAGE KIT

## (undated) DEVICE — PAD, DEFIB, ADULT, RADIOTRAN, PHYSIO, LO: Brand: MEDLINE

## (undated) DEVICE — COPILOT BLEEDBACK CONTROL VALVE: Brand: COPILOT

## (undated) DEVICE — GLIDESHEATH NITINOL HYDROPHILIC COATED INTRODUCER SHEATH: Brand: GLIDESHEATH

## (undated) DEVICE — SOLUTION IRRIG 500ML 0.9% SOD CHL USP POUR PLAS BTL

## (undated) DEVICE — DEVICE TORQ FLRESCNT PNK FOR HEMSTAS VLV

## (undated) DEVICE — SYRINGE MED 50ML LUERLOCK TIP

## (undated) DEVICE — FORCEPS BX L160CM JAW DIA2.4MM YEL L CAP W/ NDL DISP RAD

## (undated) DEVICE — ADAPTER TBNG DIA15MM SWVL FBROPT BRONCHSCP TERM 2 AXIS PEEP

## (undated) DEVICE — TUBING PRSS MON L12IN PVC RIG NONEXPANDING M TO FEM CONN

## (undated) DEVICE — FORCEPS BX L100CM DIA1.8MM WRK CHN 2MM PULM S STL RAD JAW 4

## (undated) DEVICE — CATHETER DIAG 6FR L110CM PIGTAILS CRV STYL PIG145 DXTERITY

## (undated) DEVICE — CANNULA NSL CANN NSL L25FT TBNG AD O2 SUP SFT UC

## (undated) DEVICE — GAUZE,SPONGE,POST-OP,4X3,STRL,LF: Brand: MEDLINE

## (undated) DEVICE — CATHETER COR DIAG JUDKINS L 3.5 CRV 6FR 100CM 0 SIDE H

## (undated) DEVICE — DEFENDO AIR WATER SUCTION AND BIOPSY VALVE KIT FOR  OLYMPUS: Brand: DEFENDO AIR/WATER/SUCTION AND BIOPSY VALVE

## (undated) DEVICE — KIT MFLD ISOLATN NACL CNTRST PRT TBNG SPIK W/ PRSS TRNSDUC